# Patient Record
Sex: FEMALE | Race: OTHER | URBAN - METROPOLITAN AREA
[De-identification: names, ages, dates, MRNs, and addresses within clinical notes are randomized per-mention and may not be internally consistent; named-entity substitution may affect disease eponyms.]

---

## 2024-07-12 ENCOUNTER — OFFICE VISIT (OUTPATIENT)
Dept: OBGYN CLINIC | Age: 29
End: 2024-07-12

## 2024-07-12 VITALS
BODY MASS INDEX: 19.83 KG/M2 | DIASTOLIC BLOOD PRESSURE: 68 MMHG | HEIGHT: 61 IN | SYSTOLIC BLOOD PRESSURE: 124 MMHG | WEIGHT: 105 LBS | HEART RATE: 88 BPM

## 2024-07-12 DIAGNOSIS — N91.2 AMENORRHEA: ICD-10-CM

## 2024-07-12 DIAGNOSIS — N91.2 AMENORRHEA: Primary | ICD-10-CM

## 2024-07-12 LAB
ABO + RH BLD: NORMAL
AMPHET UR QL SCN: NORMAL
BARBITURATES UR QL SCN: NORMAL
BASOPHILS # BLD: 0.1 K/UL (ref 0–0.2)
BASOPHILS NFR BLD: 0.7 %
BENZODIAZ UR QL SCN: NORMAL
BILIRUB UR QL STRIP: NEGATIVE
BLD GP AB SCN SERPL QL: NORMAL
CANNABINOIDS UR QL SCN: NORMAL
CLARITY UR: CLEAR
COCAINE UR QL SCN: NORMAL
COLOR UR: YELLOW
DRUG SCREEN COMMENT UR-IMP: NORMAL
EOSINOPHIL # BLD: 0 K/UL (ref 0–0.7)
EOSINOPHIL NFR BLD: 0.4 %
ERYTHROCYTE [DISTWIDTH] IN BLOOD BY AUTOMATED COUNT: 17.2 % (ref 11.5–14.5)
ESTIMATED AVERAGE GLUCOSE: 97 MG/DL
FENTANYL SCREEN, URINE: NORMAL
GLUCOSE UR STRIP-MCNC: NEGATIVE MG/DL
GONADOTROPIN, CHORIONIC (HCG) QUANT: NORMAL MIU/ML
HBA1C MFR BLD: 5 % (ref 4–6)
HBV SURFACE AG SERPL QL IA: NORMAL
HCT VFR BLD AUTO: 37.5 % (ref 37–47)
HEPATITIS C ANTIBODY: NONREACTIVE
HGB BLD-MCNC: 12.2 G/DL (ref 12–16)
HGB UR QL STRIP: NEGATIVE
HIV AG/AB: NONREACTIVE
KETONES UR STRIP-MCNC: NEGATIVE MG/DL
LEUKOCYTE ESTERASE UR QL STRIP: NEGATIVE
LYMPHOCYTES # BLD: 1.3 K/UL (ref 1–4.8)
LYMPHOCYTES NFR BLD: 18.5 %
MCH RBC QN AUTO: 27.2 PG (ref 27–31.3)
MCHC RBC AUTO-ENTMCNC: 32.5 % (ref 33–37)
MCV RBC AUTO: 83.5 FL (ref 79.4–94.8)
METHADONE UR QL SCN: NORMAL
MONOCYTES # BLD: 0.5 K/UL (ref 0.2–0.8)
MONOCYTES NFR BLD: 7.1 %
NEUTROPHILS # BLD: 5.3 K/UL (ref 1.4–6.5)
NEUTS SEG NFR BLD: 72.6 %
NITRITE UR QL STRIP: NEGATIVE
OPIATES UR QL SCN: NORMAL
OXYCODONE UR QL SCN: NORMAL
PCP UR QL SCN: NORMAL
PH UR STRIP: 7.5 [PH] (ref 5–9)
PLATELET # BLD AUTO: 277 K/UL (ref 130–400)
PROPOXYPH UR QL SCN: NORMAL
PROT UR STRIP-MCNC: NEGATIVE MG/DL
RBC # BLD AUTO: 4.49 M/UL (ref 4.2–5.4)
RUBELLA ANTIBODY IGG: 128.1 IU/ML
SICKLE CELL SCREEN: NEGATIVE
SP GR UR STRIP: 1.02 (ref 1–1.03)
TSH SERPL-MCNC: 3.32 UIU/ML (ref 0.44–3.86)
UROBILINOGEN UR STRIP-ACNC: 0.2 E.U./DL
WBC # BLD AUTO: 7.2 K/UL (ref 4.8–10.8)

## 2024-07-12 RX ORDER — VITAMIN A, VITAMIN C, VITAMIN D-3, VITAMIN E, VITAMIN B-1, VITAMIN B-2, NIACIN, VITAMIN B-6, CALCIUM, IRON, ZINC, COPPER 4000; 120; 400; 22; 1.84; 3; 20; 10; 1; 12; 200; 27; 25; 2 [IU]/1; MG/1; [IU]/1; MG/1; MG/1; MG/1; MG/1; MG/1; MG/1; UG/1; MG/1; MG/1; MG/1; MG/1
1 TABLET ORAL DAILY
Qty: 90 TABLET | Refills: 3 | Status: CANCELLED | OUTPATIENT
Start: 2024-07-12

## 2024-07-12 SDOH — ECONOMIC STABILITY: FOOD INSECURITY: WITHIN THE PAST 12 MONTHS, THE FOOD YOU BOUGHT JUST DIDN'T LAST AND YOU DIDN'T HAVE MONEY TO GET MORE.: NEVER TRUE

## 2024-07-12 SDOH — ECONOMIC STABILITY: FOOD INSECURITY: WITHIN THE PAST 12 MONTHS, YOU WORRIED THAT YOUR FOOD WOULD RUN OUT BEFORE YOU GOT MONEY TO BUY MORE.: NEVER TRUE

## 2024-07-12 SDOH — ECONOMIC STABILITY: HOUSING INSECURITY
IN THE LAST 12 MONTHS, WAS THERE A TIME WHEN YOU DID NOT HAVE A STEADY PLACE TO SLEEP OR SLEPT IN A SHELTER (INCLUDING NOW)?: NO

## 2024-07-12 SDOH — ECONOMIC STABILITY: INCOME INSECURITY: HOW HARD IS IT FOR YOU TO PAY FOR THE VERY BASICS LIKE FOOD, HOUSING, MEDICAL CARE, AND HEATING?: NOT HARD AT ALL

## 2024-07-12 ASSESSMENT — PATIENT HEALTH QUESTIONNAIRE - PHQ9
SUM OF ALL RESPONSES TO PHQ QUESTIONS 1-9: 0
1. LITTLE INTEREST OR PLEASURE IN DOING THINGS: NOT AT ALL
2. FEELING DOWN, DEPRESSED OR HOPELESS: NOT AT ALL
SUM OF ALL RESPONSES TO PHQ9 QUESTIONS 1 & 2: 0
SUM OF ALL RESPONSES TO PHQ QUESTIONS 1-9: 0

## 2024-07-12 ASSESSMENT — ENCOUNTER SYMPTOMS
CHEST TIGHTNESS: 0
ABDOMINAL PAIN: 0
NAUSEA: 0
TROUBLE SWALLOWING: 0
CONSTIPATION: 0
COLOR CHANGE: 0
SHORTNESS OF BREATH: 0
VOICE CHANGE: 0
COUGH: 0
VOMITING: 0
WHEEZING: 0
BACK PAIN: 0
SORE THROAT: 0
BLOOD IN STOOL: 0
ABDOMINAL DISTENTION: 0

## 2024-07-12 NOTE — PROGRESS NOTES
bruise/bleed easily.   Psychiatric/Behavioral:  Negative for agitation, behavioral problems, confusion, decreased concentration, dysphoric mood and suicidal ideas. The patient is not nervous/anxious and is not hyperactive.        Physical Exam  Vitals and nursing note reviewed. Exam conducted with a chaperone present.   Genitourinary:     General: Normal vulva.               Vitals:    07/12/24 0932   BP: 124/68   Site: Right Upper Arm   Pulse: 88   Weight: 47.6 kg (105 lb)   Height: 1.549 m (5' 1\")         ASSESSMENT/PLAN:     Diagnosis Orders   1. Amenorrhea  CBC with Auto Differential    Culture, Urine    HCG, Quantitative, Pregnancy    Hemoglobin A1C    Hepatitis B Surface Antigen    Hepatitis C Antibody    HIV Screen    RPR    Rubella antibody, IgG    TSH    Sickle Cell Screen    Type and Screen    Urinalysis    Urine Drug Screen    Varicella Zoster Antibody, IgG    Wet prep, genital    PAP SMEAR    C.trachomatis N.gonorrhoeae DNA, Thin Prep    US OB LESS THAN 14 WEEKS SINGLE OR FIRST GESTATION    US OB Transvaginal - Clinic Performed    US OB Transvaginal - Clinic Performed      Early intrauterine pregnancy.    PLAN: Serial hCGs.  Repeat ultrasound in 2 weeks.  Prenatal labs ordered.  Pap smear and cultures obtained.  Patient to follow-up here in 4 weeks      No follow-ups on file.      On this date 7/12/2024 I have spent 30 minutes reviewing previous notes, test results and face to face with the patient discussing the diagnosis and importance of compliance with the treatment plan as well as documenting on the day of the visit.      An electronic signature was used to authenticate this note. Please note this report has been partially produced using speech recognition software and may cause contain errors related to that system including grammar, punctuation and spelling as well as words and phrases that may seem inappropriate. If there are questions or concerns please feel free to contact me to clarify.

## 2024-07-13 LAB
BACTERIA UR CULT: NORMAL
CLUE CELLS VAG QL WET PREP: NORMAL
RPR SER QL: NORMAL
T VAGINALIS VAG QL WET PREP: NORMAL
TRICHOMONAS VAGINALIS SCREEN: NEGATIVE
YEAST VAG QL WET PREP: NORMAL

## 2024-07-14 LAB — VZV IGG SER IA-ACNC: 541.2 IV

## 2024-07-15 DIAGNOSIS — N91.2 AMENORRHEA: ICD-10-CM

## 2024-07-15 LAB — GONADOTROPIN, CHORIONIC (HCG) QUANT: NORMAL MIU/ML

## 2024-07-18 ENCOUNTER — TELEPHONE (OUTPATIENT)
Dept: OBGYN CLINIC | Age: 29
End: 2024-07-18

## 2024-07-18 DIAGNOSIS — N91.2 AMENORRHEA: Primary | ICD-10-CM

## 2024-07-18 LAB
C TRACH DNA CVX QL NAA+PROBE: NEGATIVE
N GONORRHOEA DNA SPEC QL NAA+PROBE: NEGATIVE

## 2024-08-02 ENCOUNTER — HOSPITAL ENCOUNTER (OUTPATIENT)
Dept: ULTRASOUND IMAGING | Age: 29
Discharge: HOME | End: 2024-08-02
Payer: COMMERCIAL

## 2024-08-02 DIAGNOSIS — N91.2 AMENORRHEA: ICD-10-CM

## 2024-08-02 LAB
CRL: 2.02 CM
CRL: 2.05 CM
CRL: 2.06 CM
CRL: 2.07 CM
CRL: 2.08 CM

## 2024-08-02 PROCEDURE — 76801 OB US < 14 WKS SINGLE FETUS: CPT

## 2024-08-09 ENCOUNTER — INITIAL PRENATAL (OUTPATIENT)
Dept: OBGYN CLINIC | Age: 29
End: 2024-08-09

## 2024-08-09 VITALS
SYSTOLIC BLOOD PRESSURE: 120 MMHG | WEIGHT: 110 LBS | DIASTOLIC BLOOD PRESSURE: 78 MMHG | BODY MASS INDEX: 20.78 KG/M2 | HEART RATE: 101 BPM

## 2024-08-09 DIAGNOSIS — Z34.91 PRENATAL CARE, FIRST TRIMESTER: Primary | ICD-10-CM

## 2024-08-09 DIAGNOSIS — Z3A.09 9 WEEKS GESTATION OF PREGNANCY: ICD-10-CM

## 2024-08-09 NOTE — PROGRESS NOTES
Patient's last menstrual period was 06/01/2024.  Please reference prenatal and OB flow chart for further information  PT here today for routine prenatal care  Pt endorses fetal movement and denies loss of fluid, contractions or vaginal bleeding  Patient is having mild nausea.  We offered Zofran she does want that.  Dates consistent with her early ultrasound.  EDC established at March 8  ROS:  Pt denies headache, vision changes, right upper quadrant pain, dysuria, or nausea/vomiting,     PE:  /78   Pulse (!) 101   Wt 49.9 kg (110 lb)   LMP 06/01/2024   BMI 20.78 kg/m²   Gen - Alert and oriented x 3  HEENT- NC/AT, CVS - RRR, Lungs - CTAB  Abd - FH   FHR      ASSESSMENT AND PLAN:   IUP at 9 weeks and 6 days  Patient to return here in 4 weeks  Patient will be doing panorama

## 2024-08-23 LAB
Lab: ABNORMAL
NTRA 22Q11.2 DELETION SYNDROME POPULATION-BASED RISK TEXT: ABNORMAL
NTRA 22Q11.2 DELETION SYNDROME RESULT TEXT: ABNORMAL
NTRA 22Q11.2 DELETION SYNDROME RISK SCORE TEXT: ABNORMAL
NTRA FETAL FRACTION: ABNORMAL
NTRA FETAL RHD SUMMARY: ABNORMAL
NTRA GENDER OF FETUS: ABNORMAL
NTRA MONOSOMY X AGE-BASED RISK TEXT: ABNORMAL
NTRA MONOSOMY X RESULT TEXT: ABNORMAL
NTRA MONOSOMY X RISK SCORE TEXT: ABNORMAL
NTRA TRIPLOIDY RESULT TEXT: ABNORMAL
NTRA TRISOMY 13 AGE-BASED RISK TEXT: ABNORMAL
NTRA TRISOMY 13 RESULT TEXT: ABNORMAL
NTRA TRISOMY 13 RISK SCORE TEXT: ABNORMAL
NTRA TRISOMY 18 AGE-BASED RISK TEXT: ABNORMAL
NTRA TRISOMY 18 RESULT TEXT: ABNORMAL
NTRA TRISOMY 18 RISK SCORE TEXT: ABNORMAL
NTRA TRISOMY 21 AGE-BASED RISK TEXT: ABNORMAL
NTRA TRISOMY 21 RESULT TEXT: ABNORMAL
NTRA TRISOMY 21 RISK SCORE TEXT: ABNORMAL

## 2024-08-26 DIAGNOSIS — G44.229 CHRONIC TENSION-TYPE HEADACHE, NOT INTRACTABLE: Primary | ICD-10-CM

## 2024-08-26 RX ORDER — BUTALBITAL, ACETAMINOPHEN AND CAFFEINE 50; 325; 40 MG/1; MG/1; MG/1
1 TABLET ORAL EVERY 4 HOURS PRN
Qty: 180 TABLET | Refills: 3 | Status: SHIPPED | OUTPATIENT
Start: 2024-08-26

## 2025-03-16 ENCOUNTER — ANESTHESIA (OUTPATIENT)
Dept: OBSTETRICS AND GYNECOLOGY | Facility: HOSPITAL | Age: 30
End: 2025-03-16
Payer: COMMERCIAL

## 2025-03-16 ENCOUNTER — ANESTHESIA EVENT (OUTPATIENT)
Dept: OBSTETRICS AND GYNECOLOGY | Facility: HOSPITAL | Age: 30
End: 2025-03-16
Payer: COMMERCIAL

## 2025-03-16 ENCOUNTER — HOSPITAL ENCOUNTER (INPATIENT)
Facility: HOSPITAL | Age: 30
LOS: 4 days | Discharge: HOME | End: 2025-03-20
Attending: OBSTETRICS & GYNECOLOGY | Admitting: STUDENT IN AN ORGANIZED HEALTH CARE EDUCATION/TRAINING PROGRAM
Payer: COMMERCIAL

## 2025-03-16 PROBLEM — Z3A.41 41 WEEKS GESTATION OF PREGNANCY (CODE): Status: ACTIVE | Noted: 2025-03-16

## 2025-03-16 LAB
ABO GROUP (TYPE) IN BLOOD: NORMAL
ABO GROUP (TYPE) IN BLOOD: NORMAL
ANTIBODY SCREEN: NORMAL
ERYTHROCYTE [DISTWIDTH] IN BLOOD BY AUTOMATED COUNT: 22.1 % (ref 11.5–14.5)
GLUCOSE BLD MANUAL STRIP-MCNC: 107 MG/DL (ref 74–99)
HCT VFR BLD AUTO: 36.8 % (ref 36–46)
HGB BLD-MCNC: 11.2 G/DL (ref 12–16)
HIV 1+2 AB+HIV1P24 AG SERPLBLD IA.RAPID: NONREACTIVE
HOLD SPECIMEN: NORMAL
MCH RBC QN AUTO: 24.5 PG (ref 26–34)
MCHC RBC AUTO-ENTMCNC: 30.4 G/DL (ref 32–36)
MCV RBC AUTO: 81 FL (ref 80–100)
NRBC BLD-RTO: 0 /100 WBCS (ref 0–0)
PLATELET # BLD AUTO: 313 X10*3/UL (ref 150–450)
RBC # BLD AUTO: 4.57 X10*6/UL (ref 4–5.2)
RH FACTOR (ANTIGEN D): NORMAL
RH FACTOR (ANTIGEN D): NORMAL
WBC # BLD AUTO: 15 X10*3/UL (ref 4.4–11.3)

## 2025-03-16 PROCEDURE — 86780 TREPONEMA PALLIDUM: CPT | Mod: STJLAB | Performed by: STUDENT IN AN ORGANIZED HEALTH CARE EDUCATION/TRAINING PROGRAM

## 2025-03-16 PROCEDURE — 87491 CHLMYD TRACH DNA AMP PROBE: CPT | Mod: STJLAB | Performed by: STUDENT IN AN ORGANIZED HEALTH CARE EDUCATION/TRAINING PROGRAM

## 2025-03-16 PROCEDURE — 87340 HEPATITIS B SURFACE AG IA: CPT | Mod: STJLAB | Performed by: STUDENT IN AN ORGANIZED HEALTH CARE EDUCATION/TRAINING PROGRAM

## 2025-03-16 PROCEDURE — 3700000014 EPIDURAL BLOCK: Performed by: NURSE ANESTHETIST, CERTIFIED REGISTERED

## 2025-03-16 PROCEDURE — 86762 RUBELLA ANTIBODY: CPT | Mod: STJLAB | Performed by: STUDENT IN AN ORGANIZED HEALTH CARE EDUCATION/TRAINING PROGRAM

## 2025-03-16 PROCEDURE — 36415 COLL VENOUS BLD VENIPUNCTURE: CPT | Performed by: STUDENT IN AN ORGANIZED HEALTH CARE EDUCATION/TRAINING PROGRAM

## 2025-03-16 PROCEDURE — 85027 COMPLETE CBC AUTOMATED: CPT | Performed by: STUDENT IN AN ORGANIZED HEALTH CARE EDUCATION/TRAINING PROGRAM

## 2025-03-16 PROCEDURE — 1120000001 HC OB PRIVATE ROOM DAILY

## 2025-03-16 PROCEDURE — 86703 HIV-1/HIV-2 1 RESULT ANTBDY: CPT | Performed by: STUDENT IN AN ORGANIZED HEALTH CARE EDUCATION/TRAINING PROGRAM

## 2025-03-16 PROCEDURE — 2500000004 HC RX 250 GENERAL PHARMACY W/ HCPCS (ALT 636 FOR OP/ED): Performed by: STUDENT IN AN ORGANIZED HEALTH CARE EDUCATION/TRAINING PROGRAM

## 2025-03-16 PROCEDURE — 86850 RBC ANTIBODY SCREEN: CPT | Performed by: STUDENT IN AN ORGANIZED HEALTH CARE EDUCATION/TRAINING PROGRAM

## 2025-03-16 PROCEDURE — 82947 ASSAY GLUCOSE BLOOD QUANT: CPT

## 2025-03-16 PROCEDURE — 7210000002 HC LABOR PER HOUR

## 2025-03-16 PROCEDURE — 86803 HEPATITIS C AB TEST: CPT | Mod: STJLAB | Performed by: STUDENT IN AN ORGANIZED HEALTH CARE EDUCATION/TRAINING PROGRAM

## 2025-03-16 PROCEDURE — 59050 FETAL MONITOR W/REPORT: CPT

## 2025-03-16 PROCEDURE — 86923 COMPATIBILITY TEST ELECTRIC: CPT

## 2025-03-16 PROCEDURE — 2500000004 HC RX 250 GENERAL PHARMACY W/ HCPCS (ALT 636 FOR OP/ED): Performed by: NURSE ANESTHETIST, CERTIFIED REGISTERED

## 2025-03-16 PROCEDURE — 83036 HEMOGLOBIN GLYCOSYLATED A1C: CPT | Mod: STJLAB | Performed by: STUDENT IN AN ORGANIZED HEALTH CARE EDUCATION/TRAINING PROGRAM

## 2025-03-16 RX ORDER — MISOPROSTOL 200 UG/1
800 TABLET ORAL ONCE AS NEEDED
Status: DISCONTINUED | OUTPATIENT
Start: 2025-03-16 | End: 2025-03-17

## 2025-03-16 RX ORDER — TERBUTALINE SULFATE 1 MG/ML
0.25 INJECTION SUBCUTANEOUS ONCE AS NEEDED
Status: DISCONTINUED | OUTPATIENT
Start: 2025-03-16 | End: 2025-03-17

## 2025-03-16 RX ORDER — HYDRALAZINE HYDROCHLORIDE 20 MG/ML
5 INJECTION INTRAMUSCULAR; INTRAVENOUS ONCE AS NEEDED
Status: DISCONTINUED | OUTPATIENT
Start: 2025-03-16 | End: 2025-03-17

## 2025-03-16 RX ORDER — TRANEXAMIC ACID 100 MG/ML
1000 INJECTION, SOLUTION INTRAVENOUS ONCE AS NEEDED
Status: DISCONTINUED | OUTPATIENT
Start: 2025-03-16 | End: 2025-03-17

## 2025-03-16 RX ORDER — LABETALOL HYDROCHLORIDE 5 MG/ML
20 INJECTION, SOLUTION INTRAVENOUS ONCE AS NEEDED
Status: DISCONTINUED | OUTPATIENT
Start: 2025-03-16 | End: 2025-03-17

## 2025-03-16 RX ORDER — SODIUM CHLORIDE, SODIUM LACTATE, POTASSIUM CHLORIDE, CALCIUM CHLORIDE 600; 310; 30; 20 MG/100ML; MG/100ML; MG/100ML; MG/100ML
75 INJECTION, SOLUTION INTRAVENOUS CONTINUOUS
Status: DISCONTINUED | OUTPATIENT
Start: 2025-03-16 | End: 2025-03-17

## 2025-03-16 RX ORDER — OXYTOCIN 10 [USP'U]/ML
10 INJECTION, SOLUTION INTRAMUSCULAR; INTRAVENOUS ONCE AS NEEDED
Status: DISCONTINUED | OUTPATIENT
Start: 2025-03-16 | End: 2025-03-17

## 2025-03-16 RX ORDER — FENTANYL/ROPIVACAINE/NS/PF 2MCG/ML-.2
0-25 PLASTIC BAG, INJECTION (ML) INJECTION CONTINUOUS
Status: DISCONTINUED | OUTPATIENT
Start: 2025-03-16 | End: 2025-03-17

## 2025-03-16 RX ORDER — ONDANSETRON 4 MG/1
4 TABLET, FILM COATED ORAL EVERY 6 HOURS PRN
Status: DISCONTINUED | OUTPATIENT
Start: 2025-03-16 | End: 2025-03-17

## 2025-03-16 RX ORDER — ONDANSETRON HYDROCHLORIDE 2 MG/ML
4 INJECTION, SOLUTION INTRAVENOUS EVERY 6 HOURS PRN
Status: DISCONTINUED | OUTPATIENT
Start: 2025-03-16 | End: 2025-03-17

## 2025-03-16 RX ORDER — OXYTOCIN/0.9 % SODIUM CHLORIDE 30/500 ML
60 PLASTIC BAG, INJECTION (ML) INTRAVENOUS ONCE AS NEEDED
Status: DISCONTINUED | OUTPATIENT
Start: 2025-03-16 | End: 2025-03-17

## 2025-03-16 RX ORDER — LIDOCAINE HYDROCHLORIDE 10 MG/ML
30 INJECTION, SOLUTION INFILTRATION; PERINEURAL ONCE AS NEEDED
Status: DISCONTINUED | OUTPATIENT
Start: 2025-03-16 | End: 2025-03-17

## 2025-03-16 RX ORDER — METHYLERGONOVINE MALEATE 0.2 MG/ML
0.2 INJECTION INTRAVENOUS ONCE AS NEEDED
Status: DISCONTINUED | OUTPATIENT
Start: 2025-03-16 | End: 2025-03-17

## 2025-03-16 RX ORDER — OXYTOCIN/0.9 % SODIUM CHLORIDE 30/500 ML
2-30 PLASTIC BAG, INJECTION (ML) INTRAVENOUS CONTINUOUS
Status: DISCONTINUED | OUTPATIENT
Start: 2025-03-17 | End: 2025-03-17

## 2025-03-16 RX ORDER — LOPERAMIDE HYDROCHLORIDE 2 MG/1
4 CAPSULE ORAL EVERY 2 HOUR PRN
Status: DISCONTINUED | OUTPATIENT
Start: 2025-03-16 | End: 2025-03-17

## 2025-03-16 RX ORDER — CARBOPROST TROMETHAMINE 250 UG/ML
250 INJECTION, SOLUTION INTRAMUSCULAR ONCE AS NEEDED
Status: DISCONTINUED | OUTPATIENT
Start: 2025-03-16 | End: 2025-03-17

## 2025-03-16 RX ADMIN — SODIUM CHLORIDE, POTASSIUM CHLORIDE, SODIUM LACTATE AND CALCIUM CHLORIDE 75 ML/HR: 600; 310; 30; 20 INJECTION, SOLUTION INTRAVENOUS at 22:16

## 2025-03-16 RX ADMIN — ONDANSETRON 4 MG: 2 INJECTION INTRAMUSCULAR; INTRAVENOUS at 23:20

## 2025-03-16 RX ADMIN — Medication 8 ML/HR: at 21:03

## 2025-03-16 RX ADMIN — SODIUM CHLORIDE, POTASSIUM CHLORIDE, SODIUM LACTATE AND CALCIUM CHLORIDE 75 ML/HR: 600; 310; 30; 20 INJECTION, SOLUTION INTRAVENOUS at 20:00

## 2025-03-16 SDOH — HEALTH STABILITY: MENTAL HEALTH: HOW OFTEN DO YOU HAVE SIX OR MORE DRINKS ON ONE OCCASION?: NEVER

## 2025-03-16 SDOH — ECONOMIC STABILITY: FOOD INSECURITY: WITHIN THE PAST 12 MONTHS, YOU WORRIED THAT YOUR FOOD WOULD RUN OUT BEFORE YOU GOT THE MONEY TO BUY MORE.: NEVER TRUE

## 2025-03-16 SDOH — SOCIAL STABILITY: SOCIAL INSECURITY: WITHIN THE LAST YEAR, HAVE YOU BEEN AFRAID OF YOUR PARTNER OR EX-PARTNER?: NO

## 2025-03-16 SDOH — SOCIAL STABILITY: SOCIAL INSECURITY: ABUSE SCREEN: ADULT

## 2025-03-16 SDOH — SOCIAL STABILITY: SOCIAL INSECURITY: DOES ANYONE TRY TO KEEP YOU FROM HAVING/CONTACTING OTHER FRIENDS OR DOING THINGS OUTSIDE YOUR HOME?: NO

## 2025-03-16 SDOH — SOCIAL STABILITY: SOCIAL INSECURITY
WITHIN THE LAST YEAR, HAVE YOU BEEN RAPED OR FORCED TO HAVE ANY KIND OF SEXUAL ACTIVITY BY YOUR PARTNER OR EX-PARTNER?: NO

## 2025-03-16 SDOH — SOCIAL STABILITY: SOCIAL INSECURITY: WITHIN THE LAST YEAR, HAVE YOU BEEN HUMILIATED OR EMOTIONALLY ABUSED IN OTHER WAYS BY YOUR PARTNER OR EX-PARTNER?: NO

## 2025-03-16 SDOH — SOCIAL STABILITY: SOCIAL INSECURITY
WITHIN THE LAST YEAR, HAVE YOU BEEN KICKED, HIT, SLAPPED, OR OTHERWISE PHYSICALLY HURT BY YOUR PARTNER OR EX-PARTNER?: NO

## 2025-03-16 SDOH — SOCIAL STABILITY: SOCIAL INSECURITY: HAS ANYONE EVER THREATENED TO HURT YOUR FAMILY OR YOUR PETS?: NO

## 2025-03-16 SDOH — ECONOMIC STABILITY: FOOD INSECURITY: WITHIN THE PAST 12 MONTHS, THE FOOD YOU BOUGHT JUST DIDN'T LAST AND YOU DIDN'T HAVE MONEY TO GET MORE.: NEVER TRUE

## 2025-03-16 SDOH — SOCIAL STABILITY: SOCIAL INSECURITY: HAVE YOU HAD THOUGHTS OF HARMING ANYONE ELSE?: NO

## 2025-03-16 SDOH — SOCIAL STABILITY: SOCIAL INSECURITY: DO YOU FEEL ANYONE HAS EXPLOITED OR TAKEN ADVANTAGE OF YOU FINANCIALLY OR OF YOUR PERSONAL PROPERTY?: NO

## 2025-03-16 SDOH — SOCIAL STABILITY: SOCIAL INSECURITY: ARE THERE ANY APPARENT SIGNS OF INJURIES/BEHAVIORS THAT COULD BE RELATED TO ABUSE/NEGLECT?: NO

## 2025-03-16 SDOH — HEALTH STABILITY: MENTAL HEALTH: WERE YOU ABLE TO COMPLETE ALL THE BEHAVIORAL HEALTH SCREENINGS?: YES

## 2025-03-16 SDOH — HEALTH STABILITY: MENTAL HEALTH: HOW OFTEN DO YOU HAVE A DRINK CONTAINING ALCOHOL?: NEVER

## 2025-03-16 SDOH — HEALTH STABILITY: MENTAL HEALTH: HOW MANY DRINKS CONTAINING ALCOHOL DO YOU HAVE ON A TYPICAL DAY WHEN YOU ARE DRINKING?: PATIENT DOES NOT DRINK

## 2025-03-16 SDOH — SOCIAL STABILITY: SOCIAL INSECURITY: VERBAL ABUSE: DENIES

## 2025-03-16 SDOH — HEALTH STABILITY: MENTAL HEALTH: CURRENT SMOKER: 0

## 2025-03-16 SDOH — HEALTH STABILITY: MENTAL HEALTH: WISH TO BE DEAD (PAST 1 MONTH): NO

## 2025-03-16 SDOH — HEALTH STABILITY: MENTAL HEALTH: SUICIDAL BEHAVIOR (LIFETIME): NO

## 2025-03-16 SDOH — HEALTH STABILITY: MENTAL HEALTH: NON-SPECIFIC ACTIVE SUICIDAL THOUGHTS (PAST 1 MONTH): NO

## 2025-03-16 SDOH — ECONOMIC STABILITY: HOUSING INSECURITY: DO YOU FEEL UNSAFE GOING BACK TO THE PLACE WHERE YOU ARE LIVING?: NO

## 2025-03-16 SDOH — SOCIAL STABILITY: SOCIAL INSECURITY: ARE YOU OR HAVE YOU BEEN THREATENED OR ABUSED PHYSICALLY, EMOTIONALLY, OR SEXUALLY BY ANYONE?: NO

## 2025-03-16 SDOH — SOCIAL STABILITY: SOCIAL INSECURITY: PHYSICAL ABUSE: DENIES

## 2025-03-16 ASSESSMENT — PAIN SCALES - GENERAL
PAINLEVEL_OUTOF10: 0 - NO PAIN
PAINLEVEL_OUTOF10: 5 - MODERATE PAIN
PAINLEVEL_OUTOF10: 0 - NO PAIN
PAINLEVEL_OUTOF10: 8
PAINLEVEL_OUTOF10: 9
PAINLEVEL_OUTOF10: 0 - NO PAIN
PAINLEVEL_OUTOF10: 0 - NO PAIN
PAINLEVEL_OUTOF10: 10 - WORST POSSIBLE PAIN

## 2025-03-16 ASSESSMENT — LIFESTYLE VARIABLES
AUDIT-C TOTAL SCORE: 0
SKIP TO QUESTIONS 9-10: 1

## 2025-03-16 ASSESSMENT — ACTIVITIES OF DAILY LIVING (ADL): LACK_OF_TRANSPORTATION: NO

## 2025-03-16 ASSESSMENT — PATIENT HEALTH QUESTIONNAIRE - PHQ9
SUM OF ALL RESPONSES TO PHQ9 QUESTIONS 1 & 2: 0
2. FEELING DOWN, DEPRESSED OR HOPELESS: NOT AT ALL
1. LITTLE INTEREST OR PLEASURE IN DOING THINGS: NOT AT ALL

## 2025-03-16 NOTE — ANESTHESIA PREPROCEDURE EVALUATION
"Patient: Rafaela Hdz    Evaluation Method: In-person visit    Procedure Information    Date: 25  Procedure: Labor Analgesia        41w1d 29 y.o.  labored at home for intended home birth and desired anesthesia.  Patient has had GERD during pregnancy.      Ht 1.549 m (5' 1\")   Wt 68.4 kg (150 lb 12.7 oz)   BMI 28.49 kg/m²        Relevant Problems   No relevant active problems       Clinical information reviewed:   Tobacco  Allergies  Meds   Med Hx  Surg Hx   Fam Hx          NPO Detail: Per L&D protocol  No data recorded     OB/Gyn Evaluation    Present Pregnancy    Patient is pregnant now.   Obstetric History                Physical Exam    Airway  Mallampati: II  TM distance: >3 FB  Neck ROM: full     Cardiovascular   Rhythm: regular  Rate: normal     Dental    Pulmonary   Breath sounds clear to auscultation     Abdominal     Comments: gravid           Anesthesia Plan    History of general anesthesia?: no  History of complications of general anesthesia?: unknown/emergency    ASA 2     epidural   (Patient has a history of PONV.    Patient has no family history of problems with anesthesia     I informed and discussed the risks and benefits of general, spinal and epidural anesthesia with the patient.  The patient expressed her understanding and her questions were answered.  A verbal consent was given by the patient.  )  The patient is not a current smoker.    Anesthetic plan and risks discussed with patient.  Use of blood products discussed with patient who.        "

## 2025-03-17 PROBLEM — Z3A.41 41 WEEKS GESTATION OF PREGNANCY (CODE): Status: RESOLVED | Noted: 2025-03-16 | Resolved: 2025-03-17

## 2025-03-17 LAB
EST. AVERAGE GLUCOSE BLD GHB EST-MCNC: 97 MG/DL
HBA1C MFR BLD: 5 %
HBV SURFACE AG SERPL QL IA: NONREACTIVE
HCV AB SER QL: NONREACTIVE
RUBV IGG SERPL IA-ACNC: 1 IA
RUBV IGG SERPL QL IA: POSITIVE
TREPONEMA PALLIDUM IGG+IGM AB [PRESENCE] IN SERUM OR PLASMA BY IMMUNOASSAY: NONREACTIVE

## 2025-03-17 PROCEDURE — 2500000004 HC RX 250 GENERAL PHARMACY W/ HCPCS (ALT 636 FOR OP/ED): Performed by: STUDENT IN AN ORGANIZED HEALTH CARE EDUCATION/TRAINING PROGRAM

## 2025-03-17 PROCEDURE — 3E033VJ INTRODUCTION OF OTHER HORMONE INTO PERIPHERAL VEIN, PERCUTANEOUS APPROACH: ICD-10-PCS | Performed by: OBSTETRICS & GYNECOLOGY

## 2025-03-17 PROCEDURE — 2500000002 HC RX 250 W HCPCS SELF ADMINISTERED DRUGS (ALT 637 FOR MEDICARE OP, ALT 636 FOR OP/ED): Performed by: OBSTETRICS & GYNECOLOGY

## 2025-03-17 PROCEDURE — 59514 CESAREAN DELIVERY ONLY: CPT | Performed by: OBSTETRICS & GYNECOLOGY

## 2025-03-17 PROCEDURE — 7210000002 HC LABOR PER HOUR

## 2025-03-17 PROCEDURE — 2720000007 HC OR 272 NO HCPCS: Performed by: OBSTETRICS & GYNECOLOGY

## 2025-03-17 PROCEDURE — 2500000004 HC RX 250 GENERAL PHARMACY W/ HCPCS (ALT 636 FOR OP/ED): Performed by: OBSTETRICS & GYNECOLOGY

## 2025-03-17 PROCEDURE — 87081 CULTURE SCREEN ONLY: CPT | Mod: STJLAB | Performed by: STUDENT IN AN ORGANIZED HEALTH CARE EDUCATION/TRAINING PROGRAM

## 2025-03-17 PROCEDURE — 7100000016 HC LABOR RECOVERY PER HOUR

## 2025-03-17 PROCEDURE — 2500000004 HC RX 250 GENERAL PHARMACY W/ HCPCS (ALT 636 FOR OP/ED): Performed by: NURSE ANESTHETIST, CERTIFIED REGISTERED

## 2025-03-17 PROCEDURE — 3700000014 HC AN EPIDURAL BLOCK CHARGE: Performed by: OBSTETRICS & GYNECOLOGY

## 2025-03-17 PROCEDURE — 7100000016 HC LABOR RECOVERY PER HOUR: Performed by: OBSTETRICS & GYNECOLOGY

## 2025-03-17 PROCEDURE — 51702 INSERT TEMP BLADDER CATH: CPT

## 2025-03-17 PROCEDURE — 10H07YZ INSERTION OF OTHER DEVICE INTO PRODUCTS OF CONCEPTION, VIA NATURAL OR ARTIFICIAL OPENING: ICD-10-PCS | Performed by: OBSTETRICS & GYNECOLOGY

## 2025-03-17 PROCEDURE — 2500000001 HC RX 250 WO HCPCS SELF ADMINISTERED DRUGS (ALT 637 FOR MEDICARE OP): Performed by: NURSE ANESTHETIST, CERTIFIED REGISTERED

## 2025-03-17 PROCEDURE — 1220000001 HC OB SEMI-PRIVATE ROOM DAILY

## 2025-03-17 RX ORDER — CARBOPROST TROMETHAMINE 250 UG/ML
250 INJECTION, SOLUTION INTRAMUSCULAR ONCE AS NEEDED
Status: DISCONTINUED | OUTPATIENT
Start: 2025-03-17 | End: 2025-03-20 | Stop reason: HOSPADM

## 2025-03-17 RX ORDER — OXYCODONE HYDROCHLORIDE 5 MG/1
5 TABLET ORAL EVERY 4 HOURS PRN
Status: DISCONTINUED | OUTPATIENT
Start: 2025-03-18 | End: 2025-03-20 | Stop reason: HOSPADM

## 2025-03-17 RX ORDER — TRANEXAMIC ACID 100 MG/ML
1000 INJECTION, SOLUTION INTRAVENOUS ONCE AS NEEDED
Status: ACTIVE | OUTPATIENT
Start: 2025-03-17 | End: 2025-03-19

## 2025-03-17 RX ORDER — LIDOCAINE 560 MG/1
1 PATCH PERCUTANEOUS; TOPICAL; TRANSDERMAL
Status: DISCONTINUED | OUTPATIENT
Start: 2025-03-17 | End: 2025-03-20 | Stop reason: HOSPADM

## 2025-03-17 RX ORDER — MISOPROSTOL 200 UG/1
800 TABLET ORAL ONCE AS NEEDED
Status: DISCONTINUED | OUTPATIENT
Start: 2025-03-17 | End: 2025-03-20 | Stop reason: HOSPADM

## 2025-03-17 RX ORDER — ACETAMINOPHEN 325 MG/1
975 TABLET ORAL EVERY 6 HOURS SCHEDULED
Status: DISCONTINUED | OUTPATIENT
Start: 2025-03-17 | End: 2025-03-17

## 2025-03-17 RX ORDER — LABETALOL HYDROCHLORIDE 5 MG/ML
20 INJECTION, SOLUTION INTRAVENOUS ONCE AS NEEDED
Status: DISCONTINUED | OUTPATIENT
Start: 2025-03-17 | End: 2025-03-20 | Stop reason: HOSPADM

## 2025-03-17 RX ORDER — IBUPROFEN 600 MG/1
600 TABLET ORAL EVERY 6 HOURS SCHEDULED
Status: DISCONTINUED | OUTPATIENT
Start: 2025-03-17 | End: 2025-03-17

## 2025-03-17 RX ORDER — POLYETHYLENE GLYCOL 3350 17 G/17G
17 POWDER, FOR SOLUTION ORAL 2 TIMES DAILY PRN
Status: DISCONTINUED | OUTPATIENT
Start: 2025-03-17 | End: 2025-03-20 | Stop reason: HOSPADM

## 2025-03-17 RX ORDER — ONDANSETRON 4 MG/1
4 TABLET, FILM COATED ORAL EVERY 6 HOURS PRN
Status: DISCONTINUED | OUTPATIENT
Start: 2025-03-17 | End: 2025-03-20 | Stop reason: HOSPADM

## 2025-03-17 RX ORDER — DIPHENHYDRAMINE HYDROCHLORIDE 50 MG/ML
25 INJECTION, SOLUTION INTRAMUSCULAR; INTRAVENOUS EVERY 4 HOURS PRN
Status: DISCONTINUED | OUTPATIENT
Start: 2025-03-17 | End: 2025-03-20 | Stop reason: HOSPADM

## 2025-03-17 RX ORDER — MORPHINE SULFATE 1 MG/ML
INJECTION, SOLUTION EPIDURAL; INTRATHECAL; INTRAVENOUS AS NEEDED
Status: DISCONTINUED | OUTPATIENT
Start: 2025-03-17 | End: 2025-03-18

## 2025-03-17 RX ORDER — LIDOCAINE HYDROCHLORIDE 20 MG/ML
INJECTION, SOLUTION EPIDURAL; INFILTRATION; INTRACAUDAL; PERINEURAL AS NEEDED
Status: DISCONTINUED | OUTPATIENT
Start: 2025-03-17 | End: 2025-03-18

## 2025-03-17 RX ORDER — METHYLERGONOVINE MALEATE 0.2 MG/ML
0.2 INJECTION INTRAVENOUS ONCE AS NEEDED
Status: DISCONTINUED | OUTPATIENT
Start: 2025-03-17 | End: 2025-03-20 | Stop reason: HOSPADM

## 2025-03-17 RX ORDER — KETOROLAC TROMETHAMINE 30 MG/ML
INJECTION, SOLUTION INTRAMUSCULAR; INTRAVENOUS AS NEEDED
Status: DISCONTINUED | OUTPATIENT
Start: 2025-03-17 | End: 2025-03-18

## 2025-03-17 RX ORDER — FAMOTIDINE 10 MG/ML
INJECTION INTRAVENOUS AS NEEDED
Status: DISCONTINUED | OUTPATIENT
Start: 2025-03-17 | End: 2025-03-18

## 2025-03-17 RX ORDER — DIPHENHYDRAMINE HYDROCHLORIDE 50 MG/ML
25 INJECTION, SOLUTION INTRAMUSCULAR; INTRAVENOUS EVERY 6 HOURS PRN
Status: DISCONTINUED | OUTPATIENT
Start: 2025-03-17 | End: 2025-03-20 | Stop reason: HOSPADM

## 2025-03-17 RX ORDER — ADHESIVE BANDAGE
10 BANDAGE TOPICAL
Status: DISCONTINUED | OUTPATIENT
Start: 2025-03-17 | End: 2025-03-20 | Stop reason: HOSPADM

## 2025-03-17 RX ORDER — SIMETHICONE 80 MG
80 TABLET,CHEWABLE ORAL 4 TIMES DAILY PRN
Status: DISCONTINUED | OUTPATIENT
Start: 2025-03-17 | End: 2025-03-20 | Stop reason: HOSPADM

## 2025-03-17 RX ORDER — SCOPOLAMINE 1 MG/3D
PATCH, EXTENDED RELEASE TRANSDERMAL AS NEEDED
Status: DISCONTINUED | OUTPATIENT
Start: 2025-03-17 | End: 2025-03-18

## 2025-03-17 RX ORDER — DIPHENHYDRAMINE HCL 25 MG
25 TABLET ORAL EVERY 6 HOURS PRN
Status: DISCONTINUED | OUTPATIENT
Start: 2025-03-17 | End: 2025-03-20 | Stop reason: HOSPADM

## 2025-03-17 RX ORDER — LOPERAMIDE HYDROCHLORIDE 2 MG/1
4 CAPSULE ORAL EVERY 2 HOUR PRN
Status: DISCONTINUED | OUTPATIENT
Start: 2025-03-17 | End: 2025-03-20 | Stop reason: HOSPADM

## 2025-03-17 RX ORDER — ONDANSETRON HYDROCHLORIDE 2 MG/ML
4 INJECTION, SOLUTION INTRAVENOUS EVERY 6 HOURS PRN
Status: DISCONTINUED | OUTPATIENT
Start: 2025-03-17 | End: 2025-03-20 | Stop reason: HOSPADM

## 2025-03-17 RX ORDER — ACETAMINOPHEN 325 MG/1
975 TABLET ORAL EVERY 6 HOURS SCHEDULED
Status: DISCONTINUED | OUTPATIENT
Start: 2025-03-18 | End: 2025-03-20 | Stop reason: HOSPADM

## 2025-03-17 RX ORDER — CEFAZOLIN SODIUM 2 G/100ML
2 INJECTION, SOLUTION INTRAVENOUS ONCE
Status: COMPLETED | OUTPATIENT
Start: 2025-03-17 | End: 2025-03-17

## 2025-03-17 RX ORDER — MISOPROSTOL 200 UG/1
TABLET ORAL CODE/TRAUMA/SEDATION MEDICATION
Status: DISCONTINUED | OUTPATIENT
Start: 2025-03-17 | End: 2025-03-20 | Stop reason: HOSPADM

## 2025-03-17 RX ORDER — METOCLOPRAMIDE HYDROCHLORIDE 5 MG/ML
10 INJECTION INTRAMUSCULAR; INTRAVENOUS EVERY 6 HOURS PRN
Status: DISCONTINUED | OUTPATIENT
Start: 2025-03-17 | End: 2025-03-17

## 2025-03-17 RX ORDER — FENTANYL CITRATE 50 UG/ML
INJECTION, SOLUTION INTRAMUSCULAR; INTRAVENOUS AS NEEDED
Status: DISCONTINUED | OUTPATIENT
Start: 2025-03-17 | End: 2025-03-18

## 2025-03-17 RX ORDER — KETOROLAC TROMETHAMINE 30 MG/ML
30 INJECTION, SOLUTION INTRAMUSCULAR; INTRAVENOUS EVERY 6 HOURS
Status: COMPLETED | OUTPATIENT
Start: 2025-03-17 | End: 2025-03-18

## 2025-03-17 RX ORDER — OXYCODONE HYDROCHLORIDE 10 MG/1
10 TABLET ORAL EVERY 4 HOURS PRN
Status: DISCONTINUED | OUTPATIENT
Start: 2025-03-18 | End: 2025-03-20 | Stop reason: HOSPADM

## 2025-03-17 RX ORDER — HYDRALAZINE HYDROCHLORIDE 20 MG/ML
5 INJECTION INTRAMUSCULAR; INTRAVENOUS ONCE AS NEEDED
Status: DISCONTINUED | OUTPATIENT
Start: 2025-03-17 | End: 2025-03-20 | Stop reason: HOSPADM

## 2025-03-17 RX ORDER — HYDROMORPHONE HYDROCHLORIDE 0.2 MG/ML
0.2 INJECTION INTRAMUSCULAR; INTRAVENOUS; SUBCUTANEOUS EVERY 5 MIN PRN
Status: DISCONTINUED | OUTPATIENT
Start: 2025-03-17 | End: 2025-03-20 | Stop reason: HOSPADM

## 2025-03-17 RX ORDER — PENICILLIN G 3000000 [IU]/50ML
3 INJECTION, SOLUTION INTRAVENOUS EVERY 4 HOURS
Status: DISCONTINUED | OUTPATIENT
Start: 2025-03-17 | End: 2025-03-17

## 2025-03-17 RX ORDER — OXYTOCIN 10 [USP'U]/ML
10 INJECTION, SOLUTION INTRAMUSCULAR; INTRAVENOUS ONCE AS NEEDED
Status: DISCONTINUED | OUTPATIENT
Start: 2025-03-17 | End: 2025-03-20 | Stop reason: HOSPADM

## 2025-03-17 RX ORDER — LIDOCAINE HYDROCHLORIDE AND EPINEPHRINE 10; 20 UG/ML; MG/ML
INJECTION, SOLUTION INFILTRATION; PERINEURAL AS NEEDED
Status: DISCONTINUED | OUTPATIENT
Start: 2025-03-17 | End: 2025-03-18

## 2025-03-17 RX ORDER — DEXMEDETOMIDINE HYDROCHLORIDE 100 UG/ML
INJECTION, SOLUTION INTRAVENOUS AS NEEDED
Status: DISCONTINUED | OUTPATIENT
Start: 2025-03-17 | End: 2025-03-18

## 2025-03-17 RX ORDER — NALOXONE HYDROCHLORIDE 0.4 MG/ML
0.1 INJECTION, SOLUTION INTRAMUSCULAR; INTRAVENOUS; SUBCUTANEOUS EVERY 5 MIN PRN
Status: DISCONTINUED | OUTPATIENT
Start: 2025-03-17 | End: 2025-03-20 | Stop reason: HOSPADM

## 2025-03-17 RX ORDER — AZITHROMYCIN MONOHYDRATE 500 MG/5ML
INJECTION, POWDER, LYOPHILIZED, FOR SOLUTION INTRAVENOUS AS NEEDED
Status: DISCONTINUED | OUTPATIENT
Start: 2025-03-17 | End: 2025-03-18

## 2025-03-17 RX ORDER — BISACODYL 10 MG/1
10 SUPPOSITORY RECTAL DAILY PRN
Status: DISCONTINUED | OUTPATIENT
Start: 2025-03-17 | End: 2025-03-20 | Stop reason: HOSPADM

## 2025-03-17 RX ORDER — TRANEXAMIC ACID 100 MG/ML
1000 INJECTION, SOLUTION INTRAVENOUS ONCE AS NEEDED
Status: ACTIVE | OUTPATIENT
Start: 2025-03-17 | End: 2025-03-18

## 2025-03-17 RX ORDER — ACETAMINOPHEN 120 MG/1
SUPPOSITORY RECTAL AS NEEDED
Status: DISCONTINUED | OUTPATIENT
Start: 2025-03-17 | End: 2025-03-18

## 2025-03-17 RX ORDER — NIFEDIPINE 10 MG/1
10 CAPSULE ORAL ONCE AS NEEDED
Status: DISCONTINUED | OUTPATIENT
Start: 2025-03-17 | End: 2025-03-20 | Stop reason: HOSPADM

## 2025-03-17 RX ORDER — IBUPROFEN 600 MG/1
600 TABLET ORAL EVERY 6 HOURS
Status: DISCONTINUED | OUTPATIENT
Start: 2025-03-18 | End: 2025-03-20 | Stop reason: HOSPADM

## 2025-03-17 RX ORDER — DIPHENHYDRAMINE HCL 25 MG
25 TABLET ORAL EVERY 4 HOURS PRN
Status: DISCONTINUED | OUTPATIENT
Start: 2025-03-17 | End: 2025-03-20 | Stop reason: HOSPADM

## 2025-03-17 RX ADMIN — CEFAZOLIN SODIUM 2 G: 2 INJECTION, SOLUTION INTRAVENOUS at 15:22

## 2025-03-17 RX ADMIN — LIDOCAINE HYDROCHLORIDE,EPINEPHRINE BITARTRATE 10 ML: 20; .01 INJECTION, SOLUTION INFILTRATION; PERINEURAL at 15:15

## 2025-03-17 RX ADMIN — ONDANSETRON 4 MG: 2 INJECTION INTRAMUSCULAR; INTRAVENOUS at 05:56

## 2025-03-17 RX ADMIN — SODIUM CHLORIDE, POTASSIUM CHLORIDE, SODIUM LACTATE AND CALCIUM CHLORIDE 75 ML/HR: 600; 310; 30; 20 INJECTION, SOLUTION INTRAVENOUS at 03:22

## 2025-03-17 RX ADMIN — MORPHINE SULFATE 1 MG: 1 INJECTION, SOLUTION EPIDURAL; INTRATHECAL; INTRAVENOUS at 15:55

## 2025-03-17 RX ADMIN — FAMOTIDINE 20 MG: 10 INJECTION, SOLUTION INTRAVENOUS at 15:15

## 2025-03-17 RX ADMIN — SCOPOLAMINE 1 PATCH: 1.5 PATCH, EXTENDED RELEASE TRANSDERMAL at 15:15

## 2025-03-17 RX ADMIN — LIDOCAINE HYDROCHLORIDE 100 MG: 20 INJECTION, SOLUTION EPIDURAL; INFILTRATION; INTRACAUDAL; PERINEURAL at 13:20

## 2025-03-17 RX ADMIN — FENTANYL CITRATE 100 MCG: 50 INJECTION, SOLUTION INTRAMUSCULAR; INTRAVENOUS at 12:05

## 2025-03-17 RX ADMIN — Medication 600 MILLI-UNITS/MIN: at 15:44

## 2025-03-17 RX ADMIN — METOCLOPRAMIDE 10 MG: 5 INJECTION, SOLUTION INTRAMUSCULAR; INTRAVENOUS at 10:07

## 2025-03-17 RX ADMIN — Medication 8 ML/HR: at 06:50

## 2025-03-17 RX ADMIN — DEXMEDETOMIDINE HYDROCHLORIDE 15 MCG: 100 INJECTION, SOLUTION INTRAVENOUS at 16:00

## 2025-03-17 RX ADMIN — Medication 2 MILLI-UNITS/MIN: at 00:30

## 2025-03-17 RX ADMIN — LIDOCAINE HYDROCHLORIDE,EPINEPHRINE BITARTRATE 5 ML: 20; .01 INJECTION, SOLUTION INFILTRATION; PERINEURAL at 09:06

## 2025-03-17 RX ADMIN — ACETAMINOPHEN 650 MG: 650 SUPPOSITORY RECTAL at 16:35

## 2025-03-17 RX ADMIN — MISOPROSTOL 400 MCG: 200 TABLET ORAL at 16:00

## 2025-03-17 RX ADMIN — PENICILLIN G 3 MILLION UNITS: 3000000 INJECTION, SOLUTION INTRAVENOUS at 13:04

## 2025-03-17 RX ADMIN — Medication 10 ML/HR: at 13:53

## 2025-03-17 RX ADMIN — KETOROLAC TROMETHAMINE 30 MG: 30 INJECTION, SOLUTION INTRAMUSCULAR at 23:02

## 2025-03-17 RX ADMIN — ONDANSETRON 4 MG: 2 INJECTION INTRAMUSCULAR; INTRAVENOUS at 13:04

## 2025-03-17 RX ADMIN — FENTANYL CITRATE 100 MCG: 50 INJECTION, SOLUTION INTRAMUSCULAR; INTRAVENOUS at 15:15

## 2025-03-17 RX ADMIN — MORPHINE SULFATE 1 MG: 1 INJECTION, SOLUTION EPIDURAL; INTRATHECAL; INTRAVENOUS at 15:50

## 2025-03-17 RX ADMIN — LIDOCAINE HYDROCHLORIDE 100 MG: 20 INJECTION, SOLUTION EPIDURAL; INFILTRATION; INTRACAUDAL; PERINEURAL at 12:05

## 2025-03-17 RX ADMIN — ACETAMINOPHEN 975 MG: 325 TABLET ORAL at 23:04

## 2025-03-17 RX ADMIN — DEXMEDETOMIDINE HYDROCHLORIDE 20 MCG: 100 INJECTION, SOLUTION INTRAVENOUS at 15:35

## 2025-03-17 RX ADMIN — SODIUM CHLORIDE 5 MILLION UNITS: 900 INJECTION INTRAVENOUS at 09:13

## 2025-03-17 RX ADMIN — KETOROLAC TROMETHAMINE 30 MG: 30 INJECTION, SOLUTION INTRAMUSCULAR at 15:22

## 2025-03-17 RX ADMIN — AZITHROMYCIN MONOHYDRATE 500 MG: 500 INJECTION, POWDER, LYOPHILIZED, FOR SOLUTION INTRAVENOUS at 15:22

## 2025-03-17 ASSESSMENT — PAIN SCALES - GENERAL
PAINLEVEL_OUTOF10: 0 - NO PAIN
PAINLEVEL_OUTOF10: 8
PAINLEVEL_OUTOF10: 5 - MODERATE PAIN
PAINLEVEL_OUTOF10: 0 - NO PAIN
PAINLEVEL_OUTOF10: 0 - NO PAIN
PAINLEVEL_OUTOF10: 2
PAINLEVEL_OUTOF10: 0 - NO PAIN
PAINLEVEL_OUTOF10: 7
PAINLEVEL_OUTOF10: 0 - NO PAIN
PAINLEVEL_OUTOF10: 1
PAINLEVEL_OUTOF10: 0 - NO PAIN
PAINLEVEL_OUTOF10: 2
PAINLEVEL_OUTOF10: 0 - NO PAIN
PAIN_LEVEL: 0
PAINLEVEL_OUTOF10: 3
PAINLEVEL_OUTOF10: 0 - NO PAIN

## 2025-03-17 ASSESSMENT — PAIN SCALES - PAIN ASSESSMENT IN ADVANCED DEMENTIA (PAINAD): TOTALSCORE: MEDICATION (SEE MAR)

## 2025-03-17 NOTE — PROGRESS NOTES
5/80/-2 - Chaperone ZHENG Henry    Discussed with patient likely need for augmentation. Patient is accepting of Pitocin but would like it to be increased only every hour.

## 2025-03-17 NOTE — SIGNIFICANT EVENT
SVE /-1 significant vulvar swelling and fetal molding  Recommended  delivery for arrest of dilation  Reviewed entire delivery process, all questions answered.  Pt wants to talk it over with her .

## 2025-03-17 NOTE — ANESTHESIA PROCEDURE NOTES
Epidural Block    Patient location during procedure: OB  Start time: 3/16/2025 8:40 PM  End time: 3/16/2025 9:03 PM  Reason for block: labor analgesia  Staffing  Performed: CRNA   Authorized by: ANITA Taveras    Performed by: ANITA Taveras    Preanesthetic Checklist  Completed: patient identified, IV checked, risks and benefits discussed, surgical consent, monitors and equipment checked, pre-op evaluation, timeout performed and sterile techniques followed  Block Timeout  RN/Licensed healthcare professional reads aloud to the Anesthesia provider and entire team: Patient identity, procedure with side and site, patient position, and as applicable the availability of implants/special equipment/special requirements.  Patient on coagulant treatment: no  Timeout performed at: 3/16/2025 8:42 PM  Block Placement  Patient position: sitting  Prep: DuraPrep  Sterility prep: hand, mask, cap, gloves and drape  Sedation level: no sedation  Patient monitoring: blood pressure, continuous pulse oximetry and heart rate  Approach: midline  Local numbing: lidocaine 1% to skin and subcutaneous tissues  Vertebral space: lumbar  Lumbar location: L3-L4  Epidural  Loss of resistance technique: saline  Guidance: landmark technique        Needle  Needle type: Tuohy   Needle gauge: 17  Needle length: 8.9cm  Needle insertion depth: 3.5 cm  Catheter type: end hole  Catheter size: 19 G  Catheter at skin depth: 8 cm  Catheter securement method: clear occlusive dressing    Test dose: lidocaine 1.5% with epinephrine 1-to-200,000 (3mL)  Test dose: lidocaine 1.5% with epinephrine 1-to-200,000 (3mL)  Test dose result: no positive test dose    PCEA  Medication concentration used: 0.2% Ropivacaine with 2 mcg/mL Fentanyl  Dose (mL): 5  Lockout (minutes): 20  1-Hour Limit (boluses/hr): 3  Basal Rate: 8        Assessment  Block outcome: pain improved  Number of attempts: 1  Events: no positive test dose and negative  heme/CSF/paresthesia  Procedure assessment: patient tolerated procedure well with no immediate complications  Additional Notes  Labor pain 10/10 prior to epidural placement.

## 2025-03-17 NOTE — PROGRESS NOTES
"Labor Progress Note    Subjective: Feels some pain with contractions    Objective:  Vitals: /55   Pulse 76   Temp 36.8 °C (98.2 °F) (Oral)   Resp 16   Ht 1.549 m (5' 1\")   Wt 68.4 kg (150 lb 12.7 oz)   SpO2 98%   BMI 28.49 kg/m²   SVE: 7/100/-1 ROP  FHT: 150, moderative variability, + accels, non recurrent variable deceleration, category II  Fitzgerald: q4-5 min    A/P:  Continue pitocin per protocol, will increase as moderate variability tracing overall reassuring and had been cut in half  GBS unknown, now ROM  24 hours, recommended IV PCN to prevent GBS sepsis.  Pt will consider  Category II tracing overall reassuring with moderate variaiblity, will start amnioinfusion to decrease variable decels  Epidural infusing    Yin Boggs MD  OB/GYN    "

## 2025-03-17 NOTE — CARE PLAN
The patient's goals for the shift include meet baby    The clinical goals for the shift include tolerate IOL      Problem: Vaginal Birth or  Section  Goal: Fetal and maternal status remain reassuring during the birth process  Outcome: Met  Flowsheets (Taken 3/17/2025 0645 by Cassie Henry RN)  Fetal and maternal status remain reassuring during the birth process:   Monitor fetal heart rate   Monitor vital signs   Monitor uterine activity   Monitor labor progression (Vaginal delivery)  Goal: Demonstrates labor coping techniques through delivery  Outcome: Met  Flowsheets (Taken 3/17/2025 0645 by Cassie Henry RN)  Demonstrates labor coping techniques through delivery:   Positioning, turning, and/or use of birthing ball assistance   Breathing/relaxation assistance     Problem: Safety - Adult  Goal: Free from fall injury  Outcome: Met  Flowsheets (Taken 3/17/2025 0645 by Cassie Henry RN)  Free from fall injury: Instruct family/caregiver on patient safety     Problem: Discharge Planning  Goal: Discharge to home or other facility with appropriate resources  Outcome: Met  Flowsheets (Taken 3/17/2025 0645 by Cassie Henry RN)  Discharge to home or other facility with appropriate resources: Identify barriers to discharge with patient and caregiver

## 2025-03-17 NOTE — PROGRESS NOTES
Patient with variables, Pitocin at 12 millinunits. Pitocin off. FHR imroved. No cervical change. Will place IUPC. Consider amnioinfusion.

## 2025-03-17 NOTE — ANESTHESIA POSTPROCEDURE EVALUATION
Patient: Rafaela Hdz    Procedure Summary       Date: 25 [place] [facility] [place]    Anesthesia Start:  Anesthesia Stop:     Procedure:  DELIVERY Diagnosis: (Failure to progress)    Surgeons: Yin AMANDA MD Responsible Provider: ANITA Rodriguez    Anesthesia Type: epidural ASA Status: 2            Anesthesia Type: epidural    Vitals Value Taken Time   /63 25 1651   Temp 36.5 25 1657   Pulse 88 25 1651   Resp 16 25 1657   SpO2 97 25 1657       Anesthesia Post Evaluation    Patient location during evaluation: bedside  Patient participation: complete - patient participated  Level of consciousness: awake and alert  Pain score: 0  Pain management: adequate  Multimodal analgesia pain management approach  Airway patency: patent  Two or more strategies used to mitigate risk of obstructive sleep apnea  Cardiovascular status: acceptable  Respiratory status: acceptable  Hydration status: acceptable  Postoperative Nausea and Vomiting: mild        No notable events documented.

## 2025-03-17 NOTE — CARE PLAN
The patient's goals for the shift include [place] with labor    The clinical goals for the shift include Reassuring FHR during labor      Problem: Vaginal Birth or  Section  Goal: Fetal and maternal status remain reassuring during the birth process  Outcome: Progressing  Flowsheets (Taken 3/17/2025 0645)  Fetal and maternal status remain reassuring during the birth process:   Monitor fetal heart rate   Monitor vital signs   Monitor uterine activity   Monitor labor progression (Vaginal delivery)  Goal: Demonstrates labor coping techniques through delivery  Outcome: Progressing  Flowsheets (Taken 3/17/2025 0645)  Demonstrates labor coping techniques through delivery:   Positioning, turning, and/or use of birthing ball assistance   Breathing/relaxation assistance     Problem: Safety - Adult  Goal: Free from fall injury  Outcome: Progressing  Flowsheets (Taken 3/17/2025 0645)  Free from fall injury: Instruct family/caregiver on patient safety     Problem: Discharge Planning  Goal: Discharge to home or other facility with appropriate resources  Outcome: Progressing  Flowsheets (Taken 3/17/2025 0645)  Discharge to home or other facility with appropriate resources: Identify barriers to discharge with patient and caregiver

## 2025-03-17 NOTE — L&D DELIVERY NOTE
OB Delivery Note  3/17/2025  Rafaela Hdz  29 y.o.   , Low Transverse      Op Note    G/P:   Gestational Age:  41w2d    Pre-operative diagnosis:  41 wk iup, failure to progress  Assessment & Plan         Post-operative diagnosis: Same, fetal macrosomia      Procedure: primary low transverse  section     Surgeons      Yin Boggs MD - Primary    Resident/Fellow/Other Assistant:  Surgeons and Role:  * No surgeons found with a matching role *     Procedure Summary  Anesthesia: Epidural ASA: II   Anesthesia Staff: CRNA: Lizbeth Colin, APRN-CRNA; Margaret Schneider APRN-CRNA     Operative Findings: Normal appearing gravid uterus.  Grossly normal appearing fallopian tubes and ovaries.   male infant, amniotic fluid clear, infant in cephalic presentation    Complications:  None; patient tolerated the procedure well.    Specimens Collected: No specimens collected    QBL:  878 mL  Urine Output: 150 mL          Indications for procedure:  Pt  at 41.2 sent in from home was attempting home birth.  SROM > 24 hours, started on IV PCN and pitocin.  Cervix remained 7 cm dilated for over 6 hours despite oxytocin and position was ROP.         Procedure: Pt was taken to the OR where Epidural anesthesia was dosed.  She was then placed in the dorsal supine position with a left lateral tilt. A mtz catheter was placed, SCD’s were applied, and a vaginal prep was performed. A pre-procedure time out was performed.  The pt was given prophylactic dose of IV antibiotics.  She was then prepped and draped in the usual sterile fashion. A Pfannenstiel skin incision was made with the scalpel through the skin and subcutaneous fat to the underlying fascial layer.  The fascia was incised in the midline with the scalpel and the incision was extended bilaterally. The superior aspect of the incision was grasped, tented up with Kocher clamps and the rectus muscle was dissected off. The muscles were  divided in the midline, the peritoneum was then identified and entered bluntly and incision extended superiorly and inferiorly taking care to avoid underlying viscera.  The bladder blade was inserted.           A low transverse uterine incision was made with the scalpel, the uterine cavity was entered, and the hysterotomy was extended cephalocaudally by stretching.  The infant was delivered atraumatically, the cord was clamped and cut and infant was handed off to awaiting nursing.  A cord segment and blood sample were collected.  The placenta was then expressed.  The uterus was exteriorized and cleared of all clot and debris. The uterine incision was repaired using a running locked stitch of 0-monocryl. Good hemostasis was noted.         The uterus was the placed back inside the pelvis, the gutters were cleared of all clots and debris.  The hysterotomy was again evaluated and found to be hemostatic, the underside of the fascia and bladder and the rectus muscles were also found to be hemostatic.  The fascia was closed using a running stitch of 0-PDS.  The subcutaneous layer was irrigated, small bleeders were cauterized, and the subcutaneous layer was reapproximated using a running stitch of 2-0 monocryl.  The skin was closed with 4-0 stratafix.  All counts were correct, the patient tolerated the procedure well.  Dr. Boggs was present for the entire procedure.  The patient and infant were taken back to the recovery room in stable condition.    Yin Boggs MD      Gestational Age: 41w2d  /Para:   Quantitative Blood Loss: Admission to Discharge: 0 mL (3/16/2025  7:25 PM - 3/17/2025  4:38 PM)    Abraham Hdz [[id number]]      Labor Events    Sac identifier: Sac 1  Rupture date/time: 3/16/2025 0908  Rupture type: Spontaneous  Fluid color: Clear  Fluid odor: None  Labor type: Spontaneous Onset of Labor  Labor allowed to proceed with plans for an attempted vaginal birth?: Yes  Augmentation:  None  Complications: Failure to Progress in First Stage       Labor Event Times    Labor onset date/time: 3/16/2025 0900       Placenta    Placenta delivery date/time:   Placenta removal: Expressed  Placenta appearance: Intact  Placenta disposition: pathology       Cord    Vessels: 3 vessels  Complications: None  Delayed cord clamping?: Yes  Cord blood disposition: Lab  Gases sent?: No  Stem cell collection (by provider): No       Lacerations    Episiotomy: None  Perineal laceration: None  Other lacerations?: No  Repair suture: None       Anesthesia    Method: Epidural       Operative Delivery    Forceps attempted?: No  Vacuum extractor attempted?: No       Shoulder Dystocia    Shoulder dystocia present?: No  First maneuver performed by: ZHENG Chery       [place] Delivery    Time head delivered: 3/17/2025 15:43:00  Birth date/time: 3/17/2025 15:43:07  Delivery type: , Low Transverse   categorization: primary   priority: non-scheduled, non-urgent  Indications for : Failure to Progress  Complications: Failure to Progress in First Stage       Apgars    Living status: Living  Apgar Component Scores:  1 min.:  5 min.:  10 min.:  15 min.:  20 min.:    Skin color:  1  1       Heart rate:  2  2       Reflex irritability:  2  2       Muscle tone:  2  2       Respiratory effort:  2  2       Total:  9  9       Apgars assigned by: CARRILLO BOJORQUEZ       Delivery Providers    Delivering clinician:    Provider Role     Delivery Nurse     Nursery Nurse     Resident                 Intrauterine Device Inserted : Yin Melo MD

## 2025-03-18 LAB
C TRACH RRNA SPEC QL NAA+PROBE: NEGATIVE
ERYTHROCYTE [DISTWIDTH] IN BLOOD BY AUTOMATED COUNT: 22 % (ref 11.5–14.5)
HCT VFR BLD AUTO: 31 % (ref 36–46)
HGB BLD-MCNC: 9.4 G/DL (ref 12–16)
MCH RBC QN AUTO: 25.1 PG (ref 26–34)
MCHC RBC AUTO-ENTMCNC: 30.3 G/DL (ref 32–36)
MCV RBC AUTO: 83 FL (ref 80–100)
N GONORRHOEA DNA SPEC QL PROBE+SIG AMP: NEGATIVE
NRBC BLD-RTO: 0 /100 WBCS (ref 0–0)
PLATELET # BLD AUTO: 195 X10*3/UL (ref 150–450)
RBC # BLD AUTO: 3.75 X10*6/UL (ref 4–5.2)
WBC # BLD AUTO: 13 X10*3/UL (ref 4.4–11.3)

## 2025-03-18 PROCEDURE — 2500000004 HC RX 250 GENERAL PHARMACY W/ HCPCS (ALT 636 FOR OP/ED): Performed by: OBSTETRICS & GYNECOLOGY

## 2025-03-18 PROCEDURE — 1220000001 HC OB SEMI-PRIVATE ROOM DAILY

## 2025-03-18 PROCEDURE — 85027 COMPLETE CBC AUTOMATED: CPT | Performed by: OBSTETRICS & GYNECOLOGY

## 2025-03-18 PROCEDURE — 36415 COLL VENOUS BLD VENIPUNCTURE: CPT | Performed by: OBSTETRICS & GYNECOLOGY

## 2025-03-18 PROCEDURE — 2500000001 HC RX 250 WO HCPCS SELF ADMINISTERED DRUGS (ALT 637 FOR MEDICARE OP): Performed by: OBSTETRICS & GYNECOLOGY

## 2025-03-18 RX ADMIN — KETOROLAC TROMETHAMINE 30 MG: 30 INJECTION, SOLUTION INTRAMUSCULAR at 11:36

## 2025-03-18 RX ADMIN — POLYETHYLENE GLYCOL 3350 17 G: 17 POWDER, FOR SOLUTION ORAL at 09:32

## 2025-03-18 RX ADMIN — IBUPROFEN 600 MG: 600 TABLET, FILM COATED ORAL at 18:00

## 2025-03-18 RX ADMIN — ACETAMINOPHEN 975 MG: 325 TABLET ORAL at 18:00

## 2025-03-18 RX ADMIN — ACETAMINOPHEN 975 MG: 325 TABLET ORAL at 11:36

## 2025-03-18 RX ADMIN — KETOROLAC TROMETHAMINE 30 MG: 30 INJECTION, SOLUTION INTRAMUSCULAR at 05:04

## 2025-03-18 RX ADMIN — ACETAMINOPHEN 975 MG: 325 TABLET ORAL at 05:04

## 2025-03-18 ASSESSMENT — PAIN SCALES - GENERAL
PAINLEVEL_OUTOF10: 3
PAINLEVEL_OUTOF10: 3

## 2025-03-18 ASSESSMENT — PAIN SCALES - PAIN ASSESSMENT IN ADVANCED DEMENTIA (PAINAD): TOTALSCORE: MEDICATION (SEE MAR)

## 2025-03-18 NOTE — CARE PLAN
The patient's goals for the shift include bond with baby    The clinical goals for the shift include vitals remain wnl    Over the shift, the patient did make progress toward the following goals.

## 2025-03-18 NOTE — CARE PLAN
Problem: Postpartum  Goal: Experiences normal postpartum course  Outcome: Progressing  Flowsheets (Taken 3/18/2025 1807)  Experiences normal postpartum course:   Monitor maternal vital signs   Assist with identification of coping methods and supprot resources   Med administration/monitoring of effect   Assess uterine involution   Fundal and lochia asssessments w/fundal massage, bladder emptying  Goal: Incisions, wounds, or drain sites healing without S/S of infection  Outcome: Progressing     Problem: Pain - Adult  Goal: Verbalizes/displays adequate comfort level or baseline comfort level  Outcome: Progressing     Problem: Safety - Adult  Goal: Free from fall injury  Outcome: Progressing     Problem: Discharge Planning  Goal: Discharge to home or other facility with appropriate resources  Outcome: Progressing   The patient's goals for the shift include bond with baby    The clinical goals for the shift include vitals remain stable

## 2025-03-18 NOTE — ANESTHESIA POSTPROCEDURE EVALUATION
Patient: Rafaela Hdz    Procedure Summary       Date: 25 Room / Location: [facility] [place] [facility] OB    Anesthesia Start:  Anesthesia Stop:     Procedure:  DELIVERY Diagnosis: (Failure to progress)    Surgeons: Yin AMANDA MD Responsible Provider: ANITA Rodriguez    Anesthesia Type: epidural ASA Status: 2            Anesthesia Type: epidural    Vitals Value Taken Time   /69 25 1141   Temp 36.9 °C (98.4 °F) 25 1141   Pulse 79 25 1141   Resp 17 25 1141   SpO2 97 % 25 1141       Anesthesia Post Evaluation    Patient location during evaluation: bedside  Patient participation: complete - patient participated  Level of consciousness: awake and alert  Pain management: adequate  Airway patency: patent  Cardiovascular status: acceptable  Respiratory status: acceptable and room air  Hydration status: acceptable  Postoperative Nausea and Vomiting: none  Comments: Epidural catheter removed by nursing. No redness, swelling, or drainage at puncture site.    Complete resolution of numbness. Patient is able to lift legs, bend at the knees, and ambulate.    Patient denies problems with urination.    Patient denies nausea, headache or severe back pain.         No notable events documented.

## 2025-03-18 NOTE — PROGRESS NOTES
Spiritual Care Visit  Spiritual Care Request    Reason for Visit:  Routine Visit: Introduction     Request Received From:       Focus of Care:  Visited With: Patient         Refer to :          Spiritual Care Assessment    Spiritual Assessment:                      Care Provided:  Intended Effects: Promote sense of peace, Preserve dignity and respect, Meaning-making  Methods: Offer spiritual/Methodist support  Interventions: Share words of hope and inspiration, [place], Provide a Methodist item(s)    Sense of Community and or Buddhist Affiliation:  Confucianism         Addressed Needs/Concerns and/or Bon Through:          Outcome:        Advance Directives:         Spiritual Care Annotation    Annotation:   congratulated the patient on her first baby, prayed , and gave her a keepsake baby frame.

## 2025-03-18 NOTE — PROGRESS NOTES
"Rafaela Hdz is a 29 y.o.,  who delivered at 41w2d gestation via  section due to arrest of dilation for  CPD  and is now postpartum day 1.    - Continue routine postpartum care  - Breast feeding.  Going well.   - Male infant.  Doing well.   - Discharge home on POD 2-3 pending post-operative milestones  - Follow up in 1-2 weeks for incision check     Lab Results   Component Value Date    LABRH POS 2025     Lab Results   Component Value Date    RUBIG Positive 2025       Assessment & Plan    Pregnancy Problems (from 25 to present)       Problem Noted Diagnosed Resolved    Shoulder dystocia during labor and delivery ([facility]) 3/17/2025 by Yin AMANDA MD  3/17/2025 by Yin AMANDA MD    Priority:  Medium       Overview Signed 3/17/2025 12:41 PM by Yin AMANDA MD     Resolved with suprapubic pressure         41 weeks gestation of pregnancy (CODE) 3/16/2025 by Negar Echavarria MD  3/17/2025 by Yin AMANDA MD    Priority:  Medium             Hospital course: no complications    Subjective   Pt doing well after delivery.  Moderate lochia. Ambulating and voiding without issue.  Denies nausea/vomiting.  She has not passed flatus yet . Her pain is well controlled overall.  She is not taking narcotic pain medication. Breastfeeding is going well overall.     Objective   Allergies:   Patient has no known allergies.         Last Vitals:  /69   Pulse 79   Temp 36.9 °C (98.4 °F) (Oral)   Resp 17   Ht 1.549 m (5' 1\")   Wt 68.4 kg (150 lb 12.7 oz)   SpO2 97%   Breastfeeding Yes   BMI 28.49 kg/m²     Physical Exam:  General: Examination reveals a well developed, well nourished, female, in no acute distress. She is alert and cooperative.  HEENT: PERRLA.  Lungs: Respirations unlabored  Abdomen: Soft, nondistended, appropriately tender to palpation for post-operative course, no rebound or guarding   Incision:  Clean and dry  Extremities: no redness " or tenderness in the calves or thighs, no edema  Psychological: awake and alert; oriented to person, place, and time    Lab Data:  Lab Results   Component Value Date    HGB 9.4 (L) 03/18/2025    HGB 11.2 (L) 03/16/2025

## 2025-03-18 NOTE — NURSING NOTE
RN walked in on patient sleeping with infant in bed. Educated about safe sleep. Pt would not allow infant to go back into bassinet.

## 2025-03-18 NOTE — LACTATION NOTE
"Lactation Consultant Note  Lactation Consultation  Reason for Consult: Initial assessment  Consultant Name: Gililan Rivera RN,IBCLC    Maternal Information  Has mother  before?: No  Infant to breast within first 2 hours of birth?: Yes  Exclusive Pump and Bottle Feed:  (mother deciding, initial plan was to EBF)    Maternal Assessment  Breast Assessment: Other (Comment) (def mother does not want at this time)    Infant Assessment  Infant Behavior: Sleepy, Feeding cues observed  Infant Assessment: Other (Comment) (def mother states she just fed HE colostrum via syringe)    Feeding Assessment  Nutrition Source: Breastmilk  Feeding Method: Syringe feeding, Feeding expressed breastmilk  Unable to assess infant feeding at this time: Other (Comment) (Mother is currently not latching bc of personal nipple sensitivty. Enouraged to call LC for assistance)    LATCH TOOL       Breast Pump  Pump: Hand expression (for poor feeds, volume, mother not latching NB regulary, has FOB going home to get handsfree pump)  Frequency: 8-10 times per day  Duration: 15-20 minutes per session    Other OB Lactation Tools  Lactation Tools: Lanolin    Patient Follow-up  Inpatient Lactation Follow-up Needed : Yes  Outpatient Lactation Follow-up: Scheduled (ref to [facility] out pt clinic)    Other OB Lactation Documentation  Maternal Risk Factors: Other (comment),  delivery (Mother failed homebirth)  Infant Risk Factors: High birth weight >3600 g, Poor or painful latch / restricted feedings    Recommendations/Summary  RN IBCLC in room to offer cnslt.  Mother/\"Rafaela\" is 27yo  failed home birth  - Primary  for failure to progress on 3/17/25 at 1543 for viable boy at 41.2weeks gestation. Please see chart for history. NB BW 4210,4167, APGAR 9&9 TCB 13HOL. Mother had planned to EBF. At this time she is not latching due to nipple sensitivity. Nursing staff has HE with mother. She is feedin NB via syringe getttin " g2.5ml coolostrum, NB showing hunger cues.   LC discussed at length options for feeding and encouraged mother to call as desired please see above.  Update: mother skin RN For manual pump and is to pump both breasts.  Update:  has returned with pump from home, Rn staff discussing feeding plan with mother, desire for DHM and pumping.     Update 9025: Mother told Rn staff that she wants to Exclsuviely Pump for now and that she wants to use her handsfree pump from home, and supplement with DHM until her milk is in. Denies need for LC at this time

## 2025-03-19 LAB — GP B STREP GENITAL QL CULT: ABNORMAL

## 2025-03-19 PROCEDURE — 2500000001 HC RX 250 WO HCPCS SELF ADMINISTERED DRUGS (ALT 637 FOR MEDICARE OP): Performed by: OBSTETRICS & GYNECOLOGY

## 2025-03-19 PROCEDURE — 1220000001 HC OB SEMI-PRIVATE ROOM DAILY

## 2025-03-19 RX ADMIN — ACETAMINOPHEN 975 MG: 325 TABLET ORAL at 23:45

## 2025-03-19 RX ADMIN — IBUPROFEN 600 MG: 600 TABLET, FILM COATED ORAL at 11:09

## 2025-03-19 RX ADMIN — IBUPROFEN 600 MG: 600 TABLET, FILM COATED ORAL at 23:45

## 2025-03-19 RX ADMIN — IBUPROFEN 600 MG: 600 TABLET, FILM COATED ORAL at 17:04

## 2025-03-19 RX ADMIN — ACETAMINOPHEN 975 MG: 325 TABLET ORAL at 17:04

## 2025-03-19 RX ADMIN — ACETAMINOPHEN 975 MG: 325 TABLET ORAL at 11:09

## 2025-03-19 RX ADMIN — IBUPROFEN 600 MG: 600 TABLET, FILM COATED ORAL at 05:33

## 2025-03-19 RX ADMIN — IBUPROFEN 600 MG: 600 TABLET, FILM COATED ORAL at 00:15

## 2025-03-19 RX ADMIN — ACETAMINOPHEN 975 MG: 325 TABLET ORAL at 05:33

## 2025-03-19 RX ADMIN — ACETAMINOPHEN 975 MG: 325 TABLET ORAL at 00:15

## 2025-03-19 ASSESSMENT — PAIN SCALES - GENERAL
PAINLEVEL_OUTOF10: 4
PAINLEVEL_OUTOF10: 2
PAINLEVEL_OUTOF10: 1
PAINLEVEL_OUTOF10: 6
PAINLEVEL_OUTOF10: 1

## 2025-03-19 ASSESSMENT — PAIN DESCRIPTION - DESCRIPTORS
DESCRIPTORS: CRAMPING;SORE
DESCRIPTORS: CRAMPING;SORE

## 2025-03-19 ASSESSMENT — PAIN DESCRIPTION - LOCATION: LOCATION: INCISION

## 2025-03-19 NOTE — PROGRESS NOTES
"Postpartum Progress Note    Assessment/Plan   Rafaela Hdz is a 29 y.o., , who delivered at 41w2d gestation and is now postpartum day 2.    - Plan to discharge home on POD# 2-3 pending post-operative milestones and patient comfort  - In meantime, continue routine postpartum care and management  - Encourage continued ambulation as tolerable  - Encourage continued work with Lactation for breastfeeding practice  - Male infant, doing well.  - Discuss available pain medication options for patient to request upon breakthrough pain  - Follow up in 1-2 weeks for incision check    Assessment & Plan    Pregnancy Problems (from 25 to present)       Problem Noted Diagnosed Resolved    Shoulder dystocia during labor and delivery ([facility]) 3/17/2025 by Yin AMANDA MD  3/17/2025 by Yin AMANDA MD    Priority:  Medium       Overview Signed 3/17/2025 12:41 PM by Yin AMANDA MD     Resolved with suprapubic pressure         41 weeks gestation of pregnancy (CODE) 3/16/2025 by Negar Echavarria MD  3/17/2025 by Yin AMANDA MD    Priority:  Medium             Hospital course: no complications    Subjective   Her pain is moderately controlled with current medications  She is passing flatus  She is ambulating well  She is tolerating a Adult diet Regular  She reports  tenderness and \"pinching\" due to infant's latch  She denies emotional concerns today      Overall, Ms. Hdz is recovering well and getting stronger. She is sleeping better and is attempting to ambulate, though this has been very limited due to discomfort. She endorses \"close to 10/10 pain\" when first standing up, though this pain subsides and is absent at rest. She is currently taking only Tylenol, Toradol, and Ibuprofen for pain management without use of opioid medication. She has not yet made a bowel movement but is voiding and passing flatus well. She is continuing to work with Lactation as she has had some " difficulty with breastfeeding. She says that though her infant is able to latch, he is pinching quite a bit, consistent when trying with a bottle, which makes for an uncomfortable experience. She is however in good spirits and feeling decent overall.     Objective   Allergies:   Patient has no known allergies.         Last Vitals:  Temp Pulse Resp BP MAP Pulse Ox   36.8 °C (98.2 °F) 80 16 102/78 75 98 %     Vitals Min/Max Last 24 Hours:  Temp  Min: 36.7 °C (98.1 °F)  Max: 36.9 °C (98.4 °F)  Pulse  Min: 74  Max: 81  Resp  Min: 16  Max: 17  BP  Min: 102/78  Max: 113/69  MAP (mmHg)  Min: 75  Max: 84    Intake/Output:     Intake/Output Summary (Last 24 hours) at 3/19/2025 0951  Last data filed at 3/18/2025 1400  Gross per 24 hour   Intake --   Output 800 ml   Net -800 ml       Physical Exam:  General: Examination reveals a well developed, well nourished, female, in no acute distress. She is alert and cooperative.  HEENT: PERRLA.  Lungs: clear to auscultation bilaterally.  Abdomen: soft, gravid, nontender, nondistended, no abnormal masses, no epigastric pain.  Incision: healing well.  Fundus: below umbilicus.  Extremities: no redness or tenderness in the calves or thighs, no edema.  Psychological: awake and alert; oriented to person, place, and time.      This note was prepared by Lawson Ashby, [employer]. All aspects of patient interaction and documentation were performed under the guidance of an attending physician.    Attending Addendum  Agree with above.  Patient is POD #2 s/p pLTCS for arrest of dilation in active phase.   Overall doing well.  Pain mostly well controlled with tylenol/motrin.  Lochia decreasing.  Working on breastfeeding.  Passing flatus and voiding freely.  AFVSS with normal bps.  On exam, her abdomen is soft, appropriately tender, fundus firm at U-2, incision c/d/I with dressing in place.  A+/RI.  Male infant, no circ.  Plan routine postop orders, discussed home tomorrow on POD #3.      Bev Wall MD

## 2025-03-19 NOTE — LACTATION NOTE
Lactation Consultant Note  Lactation Consultation  Reason for Consult: Follow-up assessment  Consultant Name: Britt Love    Maternal Information  Has mother  before?: No  Infant to breast within first 2 hours of birth?: Yes  Exclusive Pump and Bottle Feed: Yes    Maternal Assessment  Breast Assessment:  (Declined assessment at this time)  Nipple Assessment:  (Declined assessment at this time)  Alterations in Nipple Condition:  (Declined assessment at this time)  [place] Assessment:  (Declined assessment at this time)    Infant Assessment  Infant Behavior: Deep sleep  Infant Assessment:  (Declined assessment at this time)    Feeding Assessment  Nutrition Source: Breastmilk, Donor human milk  Feeding Method: Nursing at the breast, Feeding expressed breastmilk, Paced bottle, Syringe feeding  Unable to assess infant feeding at this time: Maternal request  Feeding Position:  (Declined assessment at this time)  Suck/Feeding:  (Declined assessment at this time)  Latch Assessment:  (Declined assessment at this time)    LATCH TOOL       Breast Pump  Pump: Individual user electric pump, Hospital grade electric pump, Manual pump  Frequency: 8-10 times per day  Duration: 15-20 minutes per session  Breast Shield Size and Type: 24 mm, 21 mm  Units of Volume: Drops    Other OB Lactation Tools  Lactation Tools: Lanolin    Patient Follow-up  Inpatient Lactation Follow-up Needed : Yes  Outpatient Lactation Follow-up: Scheduled (ref to [facility] pt clinic)  Lactation Professional - OK to Discharge: Yes    Other OB Lactation Documentation  Maternal Risk Factors:  delivery  Infant Risk Factors: High birth weight >3600 g    Recommendations/Summary  See previous note for history. 5.15% weight loss. TCB 7.4@48 hours. Parent endorses plan to exclusively pump and bottle feed EBM. Attempted latching, reports infant not sustaining suck. Pump with Momcozy from home, endorses expressing drops. Has manual pump at bedside.  Denies use of hospital Symphony pump. Educated on difference between hospital-grade pump and hands-free pump. Encouraged to use Symphony on initiate setting while in hospital. Taught setup, use on initiate setting, and cleaning of pump. Reviewed need to pump for 15-20min at least every 3 hours. Any pumped milk to be given by spoon, cup, or oral syringe. Parents verbalized understanding.

## 2025-03-19 NOTE — PROGRESS NOTES
Social Work Brief Note     Patient: Rafaela Hdz   [place] Name: Mary Hdz   [place] : 3/17/25      Reason for Visit: Support       History: Rafaela Hdz presented to [facility] on 3/17/25 for delivery of her first child after having initially planned home-delivery. Mrs. Hdz delivered term baby boy via unanticipated , and is anticipating discharge to home on 3/20/25.       Assessment:  was able to review chart and meet with Mrs. Hdz and her spouse/FOB, Sarthak Hdz, to introduce self, obtain additional information, and provide support and assistance as may be needed at this time. Both receptive.   Mrs. Hdz stated to be feeling appropriate sore given recent delivery by  and of child working on breast-feeding, but otherwise doing well. Mr. Hdz stated to be doing well too and learning child's care - learning curve acknowledged and normalized.     spoke about child's birth certificate and social security card and inquired about insurance which they stated having [employer] [place] through Mr. Hdz's employer. Copy of insurance card obtained and placed in charts.  provided reminder to add child to medical coverage within 30 days.    also inquired about history of mental health which they both denied.  spoke about baby blues, PPD, and paternal  depression and provided information about PPD, paternal  depression, and support resources available if needed.   No additional questions or needs at this time, but encouraged to have  contacted should anything arise. Support provided and self-care encouraged.       Plan:  Will remain available if/as needed through remainder of admission.       Signature:  JOHAN Patterson

## 2025-03-19 NOTE — CARE PLAN
The patient's goals for the shift include bond    The clinical goals for the shift include ambulate in hallway    Over the shift, the patient did make progress toward the following goals.

## 2025-03-20 VITALS
TEMPERATURE: 98.2 F | DIASTOLIC BLOOD PRESSURE: 60 MMHG | HEIGHT: 61 IN | WEIGHT: 150.79 LBS | RESPIRATION RATE: 16 BRPM | HEART RATE: 82 BPM | OXYGEN SATURATION: 99 % | SYSTOLIC BLOOD PRESSURE: 122 MMHG | BODY MASS INDEX: 28.47 KG/M2

## 2025-03-20 LAB
BLOOD EXPIRATION DATE: NORMAL
BLOOD EXPIRATION DATE: NORMAL
DISPENSE STATUS: NORMAL
DISPENSE STATUS: NORMAL
PRODUCT BLOOD TYPE: 6200
PRODUCT BLOOD TYPE: 6200
PRODUCT CODE: NORMAL
PRODUCT CODE: NORMAL
UNIT ABO: NORMAL
UNIT ABO: NORMAL
UNIT NUMBER: NORMAL
UNIT NUMBER: NORMAL
UNIT RH: NORMAL
UNIT RH: NORMAL
UNIT VOLUME: 350
UNIT VOLUME: 350
XM INTEP: NORMAL
XM INTEP: NORMAL

## 2025-03-20 PROCEDURE — 2500000001 HC RX 250 WO HCPCS SELF ADMINISTERED DRUGS (ALT 637 FOR MEDICARE OP): Performed by: OBSTETRICS & GYNECOLOGY

## 2025-03-20 RX ORDER — ACETAMINOPHEN 325 MG/1
1000 TABLET ORAL EVERY 6 HOURS SCHEDULED
Qty: 100 TABLET | Refills: 0 | Status: SHIPPED | OUTPATIENT
Start: 2025-03-20

## 2025-03-20 RX ORDER — IBUPROFEN 600 MG/1
600 TABLET ORAL EVERY 6 HOURS
Qty: 60 TABLET | Refills: 0 | Status: SHIPPED | OUTPATIENT
Start: 2025-03-20

## 2025-03-20 RX ADMIN — ACETAMINOPHEN 975 MG: 325 TABLET ORAL at 06:04

## 2025-03-20 RX ADMIN — IBUPROFEN 600 MG: 600 TABLET, FILM COATED ORAL at 11:30

## 2025-03-20 RX ADMIN — IBUPROFEN 600 MG: 600 TABLET, FILM COATED ORAL at 06:04

## 2025-03-20 RX ADMIN — ACETAMINOPHEN 975 MG: 325 TABLET ORAL at 11:30

## 2025-03-20 ASSESSMENT — PAIN SCALES - GENERAL
PAINLEVEL_OUTOF10: 1
PAINLEVEL_OUTOF10: 1

## 2025-03-20 NOTE — DISCHARGE SUMMARY
Discharge Summary    Admission Date: 3/16/2025  Discharge Date: 3/20/25    Discharge Diagnosis  41 weeks gestation of pregnancy (CODE)    Hospital Course  Delivery Date: 3/17/2025 3:43 PM  Delivery type: , Low Transverse   GA at delivery: 41w2d  Outcome: Living  Anesthesia during delivery: Epidural  Intrapartum complications: Failure to Progress in First Stage  Feeding method: Breastfeeding Status: Yes     Procedures:  C/section  Contraception at discharge: none      Pertinent Physical Exam At Time of Discharge    General: Examination reveals a well developed, well nourished, female, in no acute distress. She is alert and cooperative.  Incision: healing well, no drainage.  Fundus: firm and nontender.  Extremities: no redness or tenderness in the calves or thighs, no edema.  Neurological: DTRs normal and symmetrical.  Psychological: awake and alert; oriented to person, place, and time.    Last Vitals:  Temp Pulse Resp BP MAP Pulse Ox   36.8 °C (98.2 °F) 82 16 122/60 87 99 %     Discharge Meds     Your medication list        START taking these medications        Instructions Last Dose Given Next Dose Due   acetaminophen 325 mg tablet  Commonly known as: Tylenol      Take 3 tablets (975 mg) by mouth every 6 hours.       ibuprofen 600 mg tablet      Take 1 tablet (600 mg) by mouth every 6 hours.              CONTINUE taking these medications        Instructions Last Dose Given Next Dose Due   PNV 29-1 ORAL                     Where to Get Your Medications        These medications were sent to [id number] - [place], [place] - [address]  [address] [facility] [address]      Phone: [phone]   acetaminophen 325 mg tablet  ibuprofen 600 mg tablet          Complications Requiring Follow-Up  C/section    Test Results Pending At Discharge  Pending Labs       No current pending labs.            Outpatient Follow-Up  No future appointments.    I spent 20 minutes in the professional and overall care of this  patient.      Kristine Santos, DO

## 2025-03-20 NOTE — CARE PLAN
The patient's goals for the shift include bond with infant    The clinical goals for the shift include return to prepregnancy state      Problem: Postpartum  Goal: Experiences normal postpartum course  Outcome: Progressing  Flowsheets (Taken 3/18/2025 1807 by Eboni Johnson RN)  Experiences normal postpartum course:   Monitor maternal vital signs   Assist with identification of coping methods and supprot resources   Med administration/monitoring of effect   Assess uterine involution   Fundal and lochia asssessments w/fundal massage, bladder emptying  Goal: Incisions, wounds, or drain sites healing without S/S of infection  Outcome: Progressing  Flowsheets (Taken 3/20/2025 0619)  Incisions, wounds, or drain sites healing without sign and symptoms of infection: ADMISSION and DAILY: Assess and document risk factors for pressure ulcer development     Problem: Pain - Adult  Goal: Verbalizes/displays adequate comfort level or baseline comfort level  Outcome: Progressing  Flowsheets (Taken 3/20/2025 0619)  Verbalizes/displays adequate comfort level or baseline comfort level:   Encourage patient to monitor pain and request assistance   Assess pain using appropriate pain scale   Administer analgesics based on type and severity of pain and evaluate response     Problem: Safety - Adult  Goal: Free from fall injury  Outcome: Progressing  Flowsheets (Taken 3/17/2025 0645 by Cassie Henry RN)  Free from fall injury: Instruct family/caregiver on patient safety     Problem: Discharge Planning  Goal: Discharge to home or other facility with appropriate resources  Outcome: Progressing  Flowsheets (Taken 3/17/2025 0645 by Cassie Henry RN)  Discharge to home or other facility with appropriate resources: Identify barriers to discharge with patient and caregiver

## 2025-03-20 NOTE — PROGRESS NOTES
Postpartum Progress Note    Assessment/Plan   Rafaela Hdz is a 29 y.o., , who delivered at 41w2d gestation and is now postpartum day 3.    VSS  Pain well controlled with pain meds, motrin/tylenol  Bleeding minimal  Talked about activity at home in detail  Went over PP/PO precautions  DC home today    Assessment & Plan    Pregnancy Problems (from 25 to present)       Problem Noted Diagnosed Resolved    Shoulder dystocia during labor and delivery ([facility]) 3/17/2025 by Yin AMANDA MD  3/17/2025 by Yin AMANDA MD    Priority:  Medium       Overview Signed 3/17/2025 12:41 PM by Yin AMANDA MD     Resolved with suprapubic pressure         41 weeks gestation of pregnancy (CODE) 3/16/2025 by Negar Echavarria MD  3/17/2025 by Yin AMANDA MD    Priority:  Medium             Hospital course: chronic hypertension      Subjective   Her pain is well controlled with current medications  She is passing flatus  She is ambulating well  She is tolerating a Adult diet Regular  She reports no breast or nursing problems  She denies emotional concerns today   Her plan for contraception is none     Pt doing well, pain controlled, had BM, bleeding minimal.  No other problems.    Objective   Allergies:   Patient has no known allergies.         Last Vitals:  Temp Pulse Resp BP MAP Pulse Ox   36.8 °C (98.2 °F) 82 16 122/60 87 99 %     Vitals Min/Max Last 24 Hours:  Temp  Min: 36.6 °C (97.9 °F)  Max: 36.8 °C (98.2 °F)  Pulse  Min: 80  Max: 82  Resp  Min: 16  Max: 16  BP  Min: 112/72  Max: 122/60  MAP (mmHg)  Min: 87  Max: 87    Intake/Output:   No intake or output data in the 24 hours ending 25 1000    Physical Exam:  General: Examination reveals a well developed, well nourished, female, in no acute distress. She is alert and cooperative.  Incision: healing well, no drainage.  Fundus: firm and nontender.  Extremities: no redness or tenderness in the calves or thighs, no  edema.  Neurological: DTRs normal and symmetrical.  Psychological: awake and alert; oriented to person, place, and time.        Lab Data:

## 2025-03-20 NOTE — NURSING NOTE
.Verbal and written discharge instructions reviewed with mother, to include post birth warning signs, medication reconciliation, follow up appointments, and  care. Patient verbalized understanding of.   .Patient is being discharged in stable condition, infant strapped in car seat, HUGS tag removed skin intact and ID bands verified. Infant is being carried by mother on lap in wheelchair. No further questions at this time. Phone numbers provided to patient and when all future appointments need to be made. Patient has all belongings and discharge folder.   Pt given list of [facility] doctors to follow up with

## 2025-03-20 NOTE — CARE PLAN
The patient's goals for the shift include discharge to home    The clinical goals for the shift include bond    Over the shift, the patient did make progress toward the following goals.

## 2025-03-27 ENCOUNTER — POSTPARTUM VISIT (OUTPATIENT)
Dept: OBSTETRICS AND GYNECOLOGY | Facility: CLINIC | Age: 30
End: 2025-03-27
Payer: COMMERCIAL

## 2025-03-27 VITALS — DIASTOLIC BLOOD PRESSURE: 87 MMHG | SYSTOLIC BLOOD PRESSURE: 145 MMHG | BODY MASS INDEX: 24.37 KG/M2 | WEIGHT: 129 LBS

## 2025-03-27 ASSESSMENT — EDINBURGH POSTNATAL DEPRESSION SCALE (EPDS)
I HAVE LOOKED FORWARD WITH ENJOYMENT TO THINGS: AS MUCH AS I EVER DID
I HAVE FELT SAD OR MISERABLE: NO, NOT AT ALL
THINGS HAVE BEEN GETTING ON TOP OF ME: NO, I HAVE BEEN COPING AS WELL AS EVER
I HAVE BEEN ABLE TO LAUGH AND SEE THE FUNNY SIDE OF THINGS: AS MUCH AS I ALWAYS COULD
I HAVE BEEN SO UNHAPPY THAT I HAVE BEEN CRYING: NO, NEVER
I HAVE BLAMED MYSELF UNNECESSARILY WHEN THINGS WENT WRONG: NO, NEVER
I HAVE BEEN ANXIOUS OR WORRIED FOR NO GOOD REASON: NO, NOT AT ALL
THE THOUGHT OF HARMING MYSELF HAS OCCURRED TO ME: NEVER
I HAVE FELT SCARED OR PANICKY FOR NO GOOD REASON: NO, NOT AT ALL
TOTAL SCORE: 0
I HAVE BEEN SO UNHAPPY THAT I HAVE HAD DIFFICULTY SLEEPING: NOT AT ALL

## 2025-03-27 NOTE — PROGRESS NOTES
Patient presents today for her 1 week PPV incision check     Delivered on 2025 via  section.  On meds: Med list UTD  C/O: None    Subjective:  Rafaela Hdz is a 29 y.o.  now POD#9 from  section for arrest of descent presenting for post-operative incision check. On 3/17/25, patient underwent primary LTCS without complication.     She is overall doing well at home. She is taking tylenol for pain. She is eating, drinking, voiding, and having bowel movements.  Lochia normal. Baby is breast feeding and doing well. Mood is good.     Objective:  Vitals:    25 1526   BP: 145/87     Gen: NAD  Abd: soft, NTND  Skin: clean, dry, and intact; no erythema, discharge, or bleeding small bruise well incision centrally o/w healing well  Neuro: alert and oriented  Psych: appropriate affect    Assessment and Plan:  Rafaela Hdz is a  now POD#9 from primary LTCS  section presenting for post-operative wound check, doing well.    Problem List Items Addressed This Visit    None      Post-Op  -Meeting milestones  -Incision well-approximated without si/sx of infection  -Continue pelvic rest and lifting precautions 6 weeks    Has follow up with community [facility], declines birth control    Yin Boggs MD

## 2025-09-18 ENCOUNTER — APPOINTMENT (OUTPATIENT)
Dept: OBSTETRICS AND GYNECOLOGY | Facility: CLINIC | Age: 30
End: 2025-09-18
Payer: COMMERCIAL

## (undated) DEVICE — SUTURE, PDS II, 0 36 IN, CT-1, VIOLET

## (undated) DEVICE — CAP, BABY, 4 X 6 IN, PINK/BLUE/WHITE

## (undated) DEVICE — Device

## (undated) DEVICE — SLEEVE, SCD EXPRESS, KNEE LENGTH-MEDIUM

## (undated) DEVICE — MASK, SURGICAL, STANDARD, THE LITE ONE, BLUE

## (undated) DEVICE — SUTURE, MONOCRYL, 0, 36 IN, CT, VIOLET

## (undated) DEVICE — TRAY, SPINAL, PENCAN 25 GA X 3.5"

## (undated) DEVICE — APPLICATOR, CHLORAPREP, W/ORANGE TINT, 26ML

## (undated) DEVICE — COVER HANDLE LIGHT, STERIS, BLUE, STERILE

## (undated) DEVICE — MASK, SURGICAL, SPLASHGUARD, THE PROTECTOR, FOG FREE, BLUE/ORANGE

## (undated) DEVICE — SUTURE, MONOCRYL, 2-0, 36 IN, CT-1, UNDYED

## (undated) DEVICE — GLOVE, SURGICAL, PROTEXIS PI , 6.5, PF, LF

## (undated) DEVICE — PREP TRAY, VAGINAL

## (undated) DEVICE — TUBING, SUCTION, CONNECTING, 9/32 X 10FT, LF

## (undated) DEVICE — SUTURE, STRATAFIX, SPIRAL MONOCRYL PLUS, 3-0, PS-2 45CM, UNDYED

## (undated) DEVICE — TRAY, SURESTEP, SILICONE DRAINAGE BAG, STATLOCK, 16FR